# Patient Record
Sex: FEMALE | Race: WHITE | NOT HISPANIC OR LATINO | Employment: FULL TIME | URBAN - METROPOLITAN AREA
[De-identification: names, ages, dates, MRNs, and addresses within clinical notes are randomized per-mention and may not be internally consistent; named-entity substitution may affect disease eponyms.]

---

## 2017-05-28 ENCOUNTER — OFFICE VISIT - HEALTHEAST (OUTPATIENT)
Dept: FAMILY MEDICINE | Facility: CLINIC | Age: 37
End: 2017-05-28

## 2017-05-28 ENCOUNTER — COMMUNICATION - HEALTHEAST (OUTPATIENT)
Dept: SCHEDULING | Facility: CLINIC | Age: 37
End: 2017-05-28

## 2017-05-28 DIAGNOSIS — M70.61 TROCHANTERIC BURSITIS, RIGHT HIP: ICD-10-CM

## 2017-05-28 DIAGNOSIS — M53.3 SACROILIAC JOINT DYSFUNCTION OF RIGHT SIDE: ICD-10-CM

## 2017-05-28 DIAGNOSIS — M76.30 ILIOTIBIAL BAND SYNDROME: ICD-10-CM

## 2017-06-19 ENCOUNTER — OFFICE VISIT - HEALTHEAST (OUTPATIENT)
Dept: PHYSICAL THERAPY | Facility: REHABILITATION | Age: 37
End: 2017-06-19

## 2017-06-19 DIAGNOSIS — M62.81 GENERALIZED MUSCLE WEAKNESS: ICD-10-CM

## 2017-06-19 DIAGNOSIS — M25.551 RIGHT HIP PAIN: ICD-10-CM

## 2017-06-19 DIAGNOSIS — M53.3 SACROILIAC JOINT DYSFUNCTION OF RIGHT SIDE: ICD-10-CM

## 2017-06-21 ENCOUNTER — OFFICE VISIT - HEALTHEAST (OUTPATIENT)
Dept: PHYSICAL THERAPY | Facility: REHABILITATION | Age: 37
End: 2017-06-21

## 2017-06-21 DIAGNOSIS — M53.3 SACROILIAC JOINT DYSFUNCTION OF RIGHT SIDE: ICD-10-CM

## 2017-06-21 DIAGNOSIS — M25.551 RIGHT HIP PAIN: ICD-10-CM

## 2017-06-21 DIAGNOSIS — M62.81 GENERALIZED MUSCLE WEAKNESS: ICD-10-CM

## 2017-06-28 ENCOUNTER — COMMUNICATION - HEALTHEAST (OUTPATIENT)
Dept: PHYSICAL THERAPY | Facility: REHABILITATION | Age: 37
End: 2017-06-28

## 2017-07-05 ENCOUNTER — COMMUNICATION - HEALTHEAST (OUTPATIENT)
Dept: PHYSICAL THERAPY | Facility: REHABILITATION | Age: 37
End: 2017-07-05

## 2017-07-10 ENCOUNTER — OFFICE VISIT - HEALTHEAST (OUTPATIENT)
Dept: PHYSICAL THERAPY | Facility: REHABILITATION | Age: 37
End: 2017-07-10

## 2017-07-10 ENCOUNTER — COMMUNICATION - HEALTHEAST (OUTPATIENT)
Dept: PHYSICAL THERAPY | Facility: REHABILITATION | Age: 37
End: 2017-07-10

## 2017-07-10 DIAGNOSIS — M25.551 RIGHT HIP PAIN: ICD-10-CM

## 2017-07-10 DIAGNOSIS — M62.81 GENERALIZED MUSCLE WEAKNESS: ICD-10-CM

## 2017-07-10 DIAGNOSIS — M53.3 SACROILIAC JOINT DYSFUNCTION OF RIGHT SIDE: ICD-10-CM

## 2018-05-02 ENCOUNTER — RECORDS - HEALTHEAST (OUTPATIENT)
Dept: ADMINISTRATIVE | Facility: OTHER | Age: 38
End: 2018-05-02

## 2018-05-02 ENCOUNTER — HOSPITAL ENCOUNTER (OUTPATIENT)
Dept: CARDIOLOGY | Facility: HOSPITAL | Age: 38
Discharge: HOME | End: 2018-05-02

## 2018-05-02 DIAGNOSIS — R94.31 NONSPECIFIC ABNORMAL ELECTROCARDIOGRAM (ECG) (EKG): ICD-10-CM

## 2018-05-04 ENCOUNTER — AMBULATORY - HEALTHEAST (OUTPATIENT)
Dept: CARDIOLOGY | Facility: CLINIC | Age: 38
End: 2018-05-04

## 2018-05-04 ENCOUNTER — RECORDS - HEALTHEAST (OUTPATIENT)
Dept: ADMINISTRATIVE | Facility: OTHER | Age: 38
End: 2018-05-04

## 2018-05-07 ENCOUNTER — RECORDS - HEALTHEAST (OUTPATIENT)
Dept: LAB | Facility: CLINIC | Age: 38
End: 2018-05-07

## 2018-05-07 LAB
ALBUMIN SERPL-MCNC: 3.6 G/DL (ref 3.5–5)
ALP SERPL-CCNC: 84 U/L (ref 45–120)
ALT SERPL W P-5'-P-CCNC: 11 U/L (ref 0–45)
ANION GAP SERPL CALCULATED.3IONS-SCNC: 10 MMOL/L (ref 5–18)
AST SERPL W P-5'-P-CCNC: 17 U/L (ref 0–40)
BILIRUB SERPL-MCNC: 0.5 MG/DL (ref 0–1)
BUN SERPL-MCNC: 14 MG/DL (ref 8–22)
CALCIUM SERPL-MCNC: 9.4 MG/DL (ref 8.5–10.5)
CHLORIDE BLD-SCNC: 104 MMOL/L (ref 98–107)
CO2 SERPL-SCNC: 25 MMOL/L (ref 22–31)
CREAT SERPL-MCNC: 0.72 MG/DL (ref 0.6–1.1)
GFR SERPL CREATININE-BSD FRML MDRD: >60 ML/MIN/1.73M2
GLUCOSE BLD-MCNC: 102 MG/DL (ref 70–125)
POTASSIUM BLD-SCNC: 3.9 MMOL/L (ref 3.5–5)
PROT SERPL-MCNC: 6.5 G/DL (ref 6–8)
SODIUM SERPL-SCNC: 139 MMOL/L (ref 136–145)
TSH SERPL DL<=0.005 MIU/L-ACNC: 2.08 UIU/ML (ref 0.3–5)

## 2018-05-08 ENCOUNTER — OFFICE VISIT - HEALTHEAST (OUTPATIENT)
Dept: CARDIOLOGY | Facility: CLINIC | Age: 38
End: 2018-05-08

## 2018-05-08 DIAGNOSIS — R07.2 PRECORDIAL PAIN: ICD-10-CM

## 2018-05-08 ASSESSMENT — MIFFLIN-ST. JEOR: SCORE: 1764.51

## 2018-05-14 ENCOUNTER — COMMUNICATION - HEALTHEAST (OUTPATIENT)
Dept: CARDIOLOGY | Facility: CLINIC | Age: 38
End: 2018-05-14

## 2018-05-14 DIAGNOSIS — I10 HYPERTENSION: ICD-10-CM

## 2018-05-23 ENCOUNTER — HOSPITAL ENCOUNTER (OUTPATIENT)
Dept: CARDIOLOGY | Facility: HOSPITAL | Age: 38
Discharge: HOME | End: 2018-05-23
Attending: INTERNAL MEDICINE

## 2018-05-23 DIAGNOSIS — R07.2 PRECORDIAL PAIN: ICD-10-CM

## 2018-05-23 LAB
AORTIC ROOT: 3.18 CM
BSA FOR ECHO PROCEDURE: 2.23 M2
CV BLOOD PRESSURE: NORMAL MMHG
CV ECHO HEIGHT: 67 IN
CV ECHO WEIGHT: 233 LBS
CV STRESS CURRENT BP HE: NORMAL
CV STRESS CURRENT HR HE: 102
CV STRESS CURRENT HR HE: 102
CV STRESS CURRENT HR HE: 104
CV STRESS CURRENT HR HE: 106
CV STRESS CURRENT HR HE: 115
CV STRESS CURRENT HR HE: 117
CV STRESS CURRENT HR HE: 118
CV STRESS CURRENT HR HE: 122
CV STRESS CURRENT HR HE: 130
CV STRESS CURRENT HR HE: 130
CV STRESS CURRENT HR HE: 133
CV STRESS CURRENT HR HE: 140
CV STRESS CURRENT HR HE: 143
CV STRESS CURRENT HR HE: 143
CV STRESS CURRENT HR HE: 144
CV STRESS CURRENT HR HE: 150
CV STRESS CURRENT HR HE: 153
CV STRESS CURRENT HR HE: 153
CV STRESS CURRENT HR HE: 154
CV STRESS CURRENT HR HE: 156
CV STRESS CURRENT HR HE: 157
CV STRESS CURRENT HR HE: 157
CV STRESS CURRENT HR HE: 158
CV STRESS CURRENT HR HE: 90
CV STRESS CURRENT HR HE: 90
CV STRESS CURRENT HR HE: 91
CV STRESS CURRENT HR HE: 98
CV STRESS CURRENT HR HE: 99
CV STRESS DEVIATION TIME HE: NORMAL
CV STRESS ECHO PERCENT HR HE: NORMAL
CV STRESS EXERCISE STAGE HE: NORMAL
CV STRESS EXERCISE STAGE REACHED HE: NORMAL
CV STRESS FINAL RESTING BP HE: NORMAL
CV STRESS FINAL RESTING HR HE: 99
CV STRESS MAX HR HE: 158
CV STRESS MAX TREADMILL GRADE HE: 14
CV STRESS MAX TREADMILL SPEED HE: 3.4
CV STRESS PEAK DIA BP HE: NORMAL
CV STRESS PEAK SYS BP HE: NORMAL
CV STRESS PHASE HE: NORMAL
CV STRESS PROTOCOL HE: NORMAL
CV STRESS RESTING PT POSITION HE: NORMAL
CV STRESS ST DEVIATION AMOUNT HE: NORMAL
CV STRESS ST DEVIATION ELEVATION HE: NORMAL
CV STRESS ST EVELATION AMOUNT HE: NORMAL
CV STRESS TEST TYPE HE: NORMAL
CV STRESS TOTAL STAGE TIME MIN 1 HE: NORMAL
DOP CALC LVOT AREA: 3.7 CM2
DOP CALC LVOT DIAMETER: 2.17 CM
DOP CALC LVOT STROKE VOLUME: 59.5 CM3
DOP CALCLVOT PEAK VEL VTI: 16.1 CM
EJECTION FRACTION: 69 % (ref 55–75)
FRACTIONAL SHORTENING: 44.7 % (ref 28–44)
INTERVENTRICULAR SEPTUM IN END DIASTOLE: 0.82 CM (ref 0.6–0.9)
IVS/PW RATIO: 0.9
LEFT ATRIUM SIZE: 3.28 CM
LEFT VENTRICLE CARDIAC INDEX: 2.1 L/MIN/M2
LEFT VENTRICLE CARDIAC OUTPUT: 4.8 L/MIN
LEFT VENTRICLE DIASTOLIC VOLUME INDEX: 21.5 CM3/M2 (ref 34–74)
LEFT VENTRICLE DIASTOLIC VOLUME: 48 CM3 (ref 46–106)
LEFT VENTRICLE HEART RATE: 80 BPM
LEFT VENTRICLE MASS INDEX: 71.2 G/M2
LEFT VENTRICLE SYSTOLIC VOLUME INDEX: 6.8 CM3/M2 (ref 11–31)
LEFT VENTRICLE SYSTOLIC VOLUME: 15.1 CM3 (ref 14–42)
LEFT VENTRICULAR INTERNAL DIMENSION IN DIASTOLE: 5.08 CM (ref 3.8–5.2)
LEFT VENTRICULAR INTERNAL DIMENSION IN SYSTOLE: 2.81 CM (ref 2.2–3.5)
LEFT VENTRICULAR MASS: 158.7 G
LEFT VENTRICULAR OUTFLOW TRACT MEAN GRADIENT: 1.43 MMHG
LEFT VENTRICULAR OUTFLOW TRACT MEAN VELOCITY: 57.9 CM/S
LEFT VENTRICULAR OUTFLOW TRACT PEAK GRADIENT: 1.96 MMHG
LEFT VENTRICULAR POSTERIOR WALL IN END DIASTOLE: 0.95 CM (ref 0.6–0.9)
LV STROKE VOLUME INDEX: 26.7 ML/M2
MITRAL VALVE E/A RATIO: 1.1
MV AVERAGE E/E' RATIO: 6.6 CM/S
MV DECELERATION TIME: 0.17 S
MV E'TISSUE VEL-LAT: 12.6 CM/S
MV E'TISSUE VEL-MED: 10.6 CM/S
MV LATERAL E/E' RATIO: 6.1
MV MEDIAL E/E' RATIO: 7.2
MV PEAK A VELOCITY: 68.4 CM/S
MV PEAK E VELOCITY: 76.3 CM/S
NUC REST DIASTOLIC VOLUME INDEX: 3728 LBS
NUC REST SYSTOLIC VOLUME INDEX: 67 IN
STRESS ECHO BASELINE BP: NORMAL
STRESS ECHO BASELINE HR: 98 BPM
STRESS ECHO CALCULATED PERCENT HR: 86 %
STRESS ECHO LAST STRESS BP: NORMAL
STRESS ECHO LAST STRESS HR: 157
STRESS ECHO POST ESTIMATED WORKLOAD: 10.3
STRESS ECHO POST EXERCISE DUR MIN: 8
STRESS ECHO POST EXERCISE DUR SEC: 59
STRESS ECHO TARGET HR: 158.24
TRICUSPID VALVE ANULAR PLANE SYSTOLIC EXCURSION: 2.1 CM

## 2018-05-23 ASSESSMENT — MIFFLIN-ST. JEOR: SCORE: 1764.51

## 2018-12-03 ENCOUNTER — OFFICE VISIT - HEALTHEAST (OUTPATIENT)
Dept: FAMILY MEDICINE | Facility: CLINIC | Age: 38
End: 2018-12-03

## 2018-12-03 DIAGNOSIS — B34.9 VIRAL SYNDROME: ICD-10-CM

## 2018-12-14 ENCOUNTER — RECORDS - HEALTHEAST (OUTPATIENT)
Dept: LAB | Facility: CLINIC | Age: 38
End: 2018-12-14

## 2018-12-14 LAB
TSH SERPL DL<=0.005 MIU/L-ACNC: 0.47 UIU/ML (ref 0.3–5)
VIT B12 SERPL-MCNC: 590 PG/ML (ref 213–816)

## 2019-08-05 ENCOUNTER — COMMUNICATION - HEALTHEAST (OUTPATIENT)
Dept: TELEHEALTH | Facility: CLINIC | Age: 39
End: 2019-08-05

## 2019-08-05 ENCOUNTER — OFFICE VISIT - HEALTHEAST (OUTPATIENT)
Dept: FAMILY MEDICINE | Facility: CLINIC | Age: 39
End: 2019-08-05

## 2019-08-05 DIAGNOSIS — M54.50 ACUTE LEFT-SIDED LOW BACK PAIN WITHOUT SCIATICA: ICD-10-CM

## 2019-08-05 DIAGNOSIS — M53.3 SACROILIAC JOINT PAIN: ICD-10-CM

## 2019-08-07 ENCOUNTER — OFFICE VISIT - HEALTHEAST (OUTPATIENT)
Dept: PHYSICAL THERAPY | Facility: REHABILITATION | Age: 39
End: 2019-08-07

## 2019-08-07 DIAGNOSIS — R26.89 ANTALGIC GAIT: ICD-10-CM

## 2019-08-07 DIAGNOSIS — M53.3 PAIN OF LEFT SACROILIAC JOINT: ICD-10-CM

## 2019-08-07 DIAGNOSIS — M62.81 GENERALIZED MUSCLE WEAKNESS: ICD-10-CM

## 2019-08-14 ENCOUNTER — COMMUNICATION - HEALTHEAST (OUTPATIENT)
Dept: PHYSICAL THERAPY | Facility: REHABILITATION | Age: 39
End: 2019-08-14

## 2020-01-30 ENCOUNTER — RECORDS - HEALTHEAST (OUTPATIENT)
Dept: LAB | Facility: CLINIC | Age: 40
End: 2020-01-30

## 2020-01-30 LAB
ANION GAP SERPL CALCULATED.3IONS-SCNC: 10 MMOL/L (ref 5–18)
BUN SERPL-MCNC: 12 MG/DL (ref 8–22)
CALCIUM SERPL-MCNC: 9 MG/DL (ref 8.5–10.5)
CHLORIDE BLD-SCNC: 103 MMOL/L (ref 98–107)
CO2 SERPL-SCNC: 24 MMOL/L (ref 22–31)
CREAT SERPL-MCNC: 0.71 MG/DL (ref 0.6–1.1)
GFR SERPL CREATININE-BSD FRML MDRD: >60 ML/MIN/1.73M2
GLUCOSE BLD-MCNC: 93 MG/DL (ref 70–125)
POTASSIUM BLD-SCNC: 4 MMOL/L (ref 3.5–5)
SODIUM SERPL-SCNC: 137 MMOL/L (ref 136–145)
TSH SERPL DL<=0.005 MIU/L-ACNC: 1.57 UIU/ML (ref 0.3–5)

## 2020-02-17 ENCOUNTER — RECORDS - HEALTHEAST (OUTPATIENT)
Dept: LAB | Facility: CLINIC | Age: 40
End: 2020-02-17

## 2020-02-18 LAB
ANION GAP SERPL CALCULATED.3IONS-SCNC: 8 MMOL/L (ref 5–18)
BUN SERPL-MCNC: 13 MG/DL (ref 8–22)
CALCIUM SERPL-MCNC: 9 MG/DL (ref 8.5–10.5)
CHLORIDE BLD-SCNC: 105 MMOL/L (ref 98–107)
CO2 SERPL-SCNC: 28 MMOL/L (ref 22–31)
CREAT SERPL-MCNC: 0.67 MG/DL (ref 0.6–1.1)
GFR SERPL CREATININE-BSD FRML MDRD: >60 ML/MIN/1.73M2
GLUCOSE BLD-MCNC: 99 MG/DL (ref 70–125)
POTASSIUM BLD-SCNC: 4.1 MMOL/L (ref 3.5–5)
SODIUM SERPL-SCNC: 141 MMOL/L (ref 136–145)

## 2021-01-20 ENCOUNTER — RECORDS - HEALTHEAST (OUTPATIENT)
Dept: LAB | Facility: CLINIC | Age: 41
End: 2021-01-20

## 2021-01-20 LAB
ALBUMIN SERPL-MCNC: 4.3 G/DL (ref 3.5–5)
ALP SERPL-CCNC: 80 U/L (ref 45–120)
ALT SERPL W P-5'-P-CCNC: 14 U/L (ref 0–45)
ANION GAP SERPL CALCULATED.3IONS-SCNC: 9 MMOL/L (ref 5–18)
AST SERPL W P-5'-P-CCNC: 16 U/L (ref 0–40)
BILIRUB SERPL-MCNC: 0.7 MG/DL (ref 0–1)
BUN SERPL-MCNC: 15 MG/DL (ref 8–22)
CALCIUM SERPL-MCNC: 8.8 MG/DL (ref 8.5–10.5)
CHLORIDE BLD-SCNC: 105 MMOL/L (ref 98–107)
CHOLEST SERPL-MCNC: 158 MG/DL
CO2 SERPL-SCNC: 25 MMOL/L (ref 22–31)
CREAT SERPL-MCNC: 0.74 MG/DL (ref 0.6–1.1)
FASTING STATUS PATIENT QL REPORTED: YES
GFR SERPL CREATININE-BSD FRML MDRD: >60 ML/MIN/1.73M2
GLUCOSE BLD-MCNC: 91 MG/DL (ref 70–125)
HDLC SERPL-MCNC: 49 MG/DL
LDLC SERPL CALC-MCNC: 98 MG/DL
POTASSIUM BLD-SCNC: 4 MMOL/L (ref 3.5–5)
PROT SERPL-MCNC: 6.5 G/DL (ref 6–8)
SODIUM SERPL-SCNC: 139 MMOL/L (ref 136–145)
TRIGL SERPL-MCNC: 54 MG/DL

## 2021-03-25 ENCOUNTER — AMBULATORY - HEALTHEAST (OUTPATIENT)
Dept: ADMINISTRATIVE | Facility: CLINIC | Age: 41
End: 2021-03-25

## 2021-03-25 DIAGNOSIS — K52.9 COLITIS: ICD-10-CM

## 2021-03-26 ENCOUNTER — RECORDS - HEALTHEAST (OUTPATIENT)
Dept: RADIOLOGY | Facility: CLINIC | Age: 41
End: 2021-03-26

## 2021-03-26 RX ORDER — LOSARTAN POTASSIUM AND HYDROCHLOROTHIAZIDE 12.5; 5 MG/1; MG/1
TABLET ORAL
Status: SHIPPED | COMMUNITY
Start: 2020-01-30

## 2021-03-26 RX ORDER — METRONIDAZOLE 7.5 MG/G
GEL VAGINAL
Status: SHIPPED | COMMUNITY
Start: 2020-01-31 | End: 2022-02-22

## 2021-04-01 ENCOUNTER — OFFICE VISIT - HEALTHEAST (OUTPATIENT)
Dept: SURGERY | Facility: CLINIC | Age: 41
End: 2021-04-01

## 2021-04-01 DIAGNOSIS — K52.9 COLITIS: ICD-10-CM

## 2021-04-01 RX ORDER — SERTRALINE HYDROCHLORIDE 100 MG/1
100 TABLET, FILM COATED ORAL DAILY
Status: SHIPPED | COMMUNITY
Start: 2021-04-01 | End: 2022-02-22

## 2021-04-01 ASSESSMENT — MIFFLIN-ST. JEOR: SCORE: 1897.18

## 2021-05-17 ENCOUNTER — OFFICE VISIT - HEALTHEAST (OUTPATIENT)
Dept: UROLOGY | Facility: CLINIC | Age: 41
End: 2021-05-17

## 2021-05-17 DIAGNOSIS — N13.2 HYDRONEPHROSIS WITH URINARY OBSTRUCTION DUE TO URETERAL CALCULUS: ICD-10-CM

## 2021-05-17 DIAGNOSIS — N20.1 CALCULUS OF URETER: ICD-10-CM

## 2021-05-31 ENCOUNTER — RECORDS - HEALTHEAST (OUTPATIENT)
Dept: ADMINISTRATIVE | Facility: CLINIC | Age: 41
End: 2021-05-31

## 2021-05-31 VITALS — BODY MASS INDEX: 37.48 KG/M2 | WEIGHT: 239.3 LBS

## 2021-05-31 NOTE — PROGRESS NOTES
[facility]   Lumbo-Pelvic Initial Evaluation    Patient Name: Heather Ramos  Date of evaluation: 8/7/2019  Referral Diagnosis: Acute left-sided low back pain without sciatica  Sacroiliac joint pain [M53.3]   Referring provider: Wes Ruiz DO  Visit Diagnosis:     ICD-10-CM    1. Pain of left sacroiliac joint M53.3    2. Generalized muscle weakness M62.81    3. Antalgic gait R26.89        Assessment:      Impairments in  pain, posture, ROM, joint mobility, strength, ADL's, gait/locomotion and balance  Patient's signs and symptoms are consistent with acute L SI joint pain. Pt presents with L low back and buttocks pain that started while on vacation in June 2019. Her pain limits her ability to bend, sit, stand, walk, ascend/descend stairs, lift, dress and complete her household chores..  The POC is dynamic and will be modified on an ongoing basis.  Barriers to achieving goals as noted in the assessment section may affect outcome.  Prognosis to achieve goals is  good   Pt. is a good candidate for skilled PT services to improve pain levels and function.  Plan of care and goals were established in collaboration with patient.     Goals:  Pt. will demonstrate/verbalize independence in self-management of condition in : 4 weeks  Pt. will be independent with home exercise program in : 4 weeks    Pt will: be able to transition sit<>stand with <3/10 pain to return to PLOF in 8 weeks.  Pt will: be able to ascend/descend stairs with reciprocal pattern and without use of rail with <3/10 pain in 8 weeks.      Patient's expectations/goals are realistic.    Barriers to Learning or Achieving Goals:  Missed appointments.       Plan / Patient Instructions:        Plan of Care:   Communication with: Referral Source  Patient Related Instruction: Nature of Condition;Self Care instruction;Body mechanics;Next steps;Expected outcome;Posture;Basis of treatment;Treatment plan and rationale;Precautions  Times per Week:  1  Number of Weeks: 8  Number of Visits: 8  Discharge Planning: when goals are met or pt has reached a plateau in progress  Therapeutic Exercise: ROM;Stretching;Strengthening  Neuromuscular Reeducation: kinesio tape;posture;balance/proprioception;TNE;core  Manual Therapy: myofascial release;soft tissue mobilization;joint mobilization;muscle energy  Modalities: electrical stimulation;TENS;ultrasound;cold pack;hot pack;iontophoresis (prn)  Gait Training: to reduce pain and improve function  Equipment: theraband      Plan for next visit: progression of abdominal and hip strengthening (supine clamshells), assess response to SI joint belt, gentle MT as needed     Subjective:         Social information:   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 female who presents to therapy today with complaints of L sided low back pain that radiates to her posterior buttocks and proximal thigh. Pt returned from vacation where she was swimming and on a soft mattress with increased pain.  About 1 week prior to that she fell while roller-blading. Date of onset/duration of symptoms is mid-June while on vacation.  Pt has tried ice and heat without change in her symptoms. She also does not feel the muscle relaxer's have been helpful.     Pain Rating:3  Pain rating at best: 0  Pain rating at worst: 10  Pain description: sharp    Functional limitations are described as occurring with:   Sit-  Mild discomfort  Walk- pain increases by end of day  Transitions  Lifting leg  Dressing  Stairs- does not use left leg first  Bending  Lifting               Objective:      Note: Items left blank indicates the item was not performed or not indicated at the time of the evaluation.    Patient Outcome Measures :    Modified Oswestry Low Back Pain Disablity Questionnaire  in %: 40     Scores range from 0-100%, where a score of 0% represents minimal pain and maximal function. The minimal  clinically important difference is a score reduction of 12%.    Examination  1. Pain of left sacroiliac joint     2. Generalized muscle weakness     3. Antalgic gait       Involved side: Left  Posture Observation:   Supine: symmetrical leg length and ASIS    Increased time and slow, guarded transitions supine<>sit    Lumbar ROM:    Date: 8/7/2019     *Indicate scale AROM AROM AROM   Lumbar Flexion To proximal knees with pain     Lumbar Extension Severe with pain      Right Left Right Left Right Left   Lumbar Sidebending Mod with pain on L Severe with pain       Lumbar Rotation mild mild       Thoracic Flexion      Thoracic Extension      Thoracic Sidebending         Thoracic Rotation           Lower Extremity Strength:     Date: 8/7/2019     LE strength/5 Right Left Right Left Right Left   Hip Flexion (L1-3) 4 2+       Hip Extension (L5-S1)         Hip Abduction (L4-5)         Hip Adduction (L2-3)         Hip External Rotation 3+ 3+       Hip Internal Rotation         Knee Extension (L3-4) 5 5       Knee Flexion         Ankle Dorsiflexion (L4-5) 5 5       Great Toe Extension (L5)         Ankle Plantar flexion (S1)         Abdominals        Sensation    WNL       Reflex Testing  Lumbar Dermatomes Right Left UE Reflexes Right Left   Iliac Crest and Groin (L1)   Biceps (C5-6)     Anterior Medial Thigh (L2)   Brachioradialis (C5-6)     Anterior Thigh, Medial Epicondyle Femur (L3)   Triceps (C7-8)     Lateral Thigh, Anterior Knee, Medial Leg/Malleolus (L4)   Florin s test     Lateral Leg, Dorsal Foot (L5)   LE Reflexes     Lateral Foot (S1)   Patellar (L3-4)     Posterior Leg (S2)   Achilles (S1-2)     Other:   Babinski Response         Lumbar Special Tests:       Lumbar Special Tests Right Left SI Tests Right  Left   Quadrant test   SI Compression - +   Straight leg raise - - SI Distraction - +   Crossover response   POSH Test     Slump   Sacral Thrust     Sit-up test  FADIR - -   Trunk extensor endurance test   Resisted Abduction     Prone instability test  Other:USMAN - -   Pubic shotgun  Other:Scour - -       LE Screen:  Hip PROM painful on R and limited due to pain in flexion, IR and ER    Treatment Today   Pt arrived 10 min late.  TREATMENT MINUTES COMMENTS   Evaluation 22 -lumbar spine   Self-care/ Home management 8 -pt completed a trial of the SI-LOC belt, reported relief with sit<>stand, walking and sitting. Pt educated on where to purchase for home use.   Manual therapy     Neuromuscular Re-education     Therapeutic Activity     Therapeutic Exercises 10 -see exercise flow sheet  -educated on POC, diagnosis and HEP   Gait training     Modality__________________                Total 40    Blank areas are intentional and mean the treatment did not include these items.     PT Evaluation Code: (Please list factors)  Patient History/Comorbidities: see above  Examination: lumbar spine  Clinical Presentation: stable  Clinical Decision Making: low    Patient History/  Comorbidities Examination  (body structures and functions, activity limitations, and/or participation restrictions) Clinical Presentation Clinical Decision Making (Complexity)   No documented Comorbidities or personal factors 1-2 Elements Stable and/or uncomplicated Low   1-2 documented comorbidities or personal factor 3 Elements Evolving clinical presentation with changing characteristics Moderate   3-4 documented comorbidities or personal factors 4 or more Unstable and unpredictable High                Yesica Chaves, PT, DPT  8/7/2019  8:00 AM      [facility] Discharge Summary    Patient was seen for 1 visit on 8/7/19 with 3 missed appointments.  The patient has not shown for their scheduled appointment(s) and has not called ot reschedule.  Goals were not met.  Three attempts to contact the patient were made, however the patient has not responded to our attempts.  Patient received a home program .  The patient discontinued therapy, did not  return. Goals not met as pt did not return to PT.    Therapy will be discontinued at this time.  The patient will need a new referral to resume.    Thank you for your referral.  Yesica Chaves  10/23/2019  2:02 PM

## 2021-06-01 VITALS — WEIGHT: 233 LBS | HEIGHT: 67 IN | BODY MASS INDEX: 36.57 KG/M2

## 2021-06-01 VITALS — WEIGHT: 233 LBS | BODY MASS INDEX: 36.57 KG/M2 | HEIGHT: 67 IN

## 2021-06-02 VITALS — WEIGHT: 240.3 LBS | BODY MASS INDEX: 37.64 KG/M2

## 2021-06-03 ENCOUNTER — RECORDS - HEALTHEAST (OUTPATIENT)
Dept: ADMINISTRATIVE | Facility: CLINIC | Age: 41
End: 2021-06-03

## 2021-06-03 VITALS — WEIGHT: 250.19 LBS | BODY MASS INDEX: 39.18 KG/M2

## 2021-06-05 VITALS
DIASTOLIC BLOOD PRESSURE: 102 MMHG | WEIGHT: 257 LBS | SYSTOLIC BLOOD PRESSURE: 156 MMHG | HEIGHT: 69 IN | BODY MASS INDEX: 38.06 KG/M2

## 2021-06-11 NOTE — PROGRESS NOTES
[facility] Discharge Summary  Patient Name: Heather Ramos  Date: 7/10/2017  Referral Diagnosis: Right Hip/Back Pain  Referring provider: Lio Gregorio MD  Visit Diagnosis: No diagnosis found.    Goals:  Pt. will demonstrate/verbalize independence in self-management of condition in : 6 weeks  Pt will: Able to get in and out of a chair without difficulty within 4-6 weeks  Pt will: Able to ambulate up stairs with pain 0-2/10 within 4-6 weeks  Pt will: Able to lift son and not have fear of taking first few steps with pain levels 0-2/10 within 4-6 weeks    Patient was seen for 2 visits from 6/19/17 to 6/21/17 with 3 missed appointments.  The patient has not shown for their scheduled appointment(s) and has not called ot reschedule.  Goals were not met.    Therapy will be discontinued at this time.  The patient will need a new referral to resume.    Thank you for your referral.  Amaya Jimenez  7/10/2017  8:23 AM

## 2021-06-11 NOTE — PROGRESS NOTES
[facility]   Lumbo-Pelvic Initial Evaluation    Patient Name: Heather Ramos  Date of evaluation: 6/19/2017  Referral Diagnosis: Trochanteric bursitis, right hip, SI pain  Referring provider: Wes Ruiz DO  Visit Diagnosis:     ICD-10-CM    1. Sacroiliac joint dysfunction of right side M53.3    2. Right hip pain M25.551    3. Generalized muscle weakness M62.81        Assessment:      Pt. is appropriate for skilled PT intervention as outlined in the Plan of Care (POC).  Pt. is a good candidate for skilled PT services to improve pain levels and function.  Pt felt relief with the MET's.      Goals:  Pt. will demonstrate/verbalize independence in self-management of condition in : 6 weeks  Pt will: Able to get in and out of a chair without difficulty within 4-6 weeks  Pt will: Able to ambulate up stairs with pain 0-2/10 within 4-6 weeks  Pt will: Able to lift son and not have fear of taking first few steps with pain levels 0-2/10 within 4-6 weeks    Patient's expectations/goals are realistic.    Barriers to Learning or Achieving Goals:  none       Plan / Patient Instructions:        Plan of Care:   Authorization / Certification Start Date: 06/19/17  Authorization / Certification End Date: 09/12/17  Authorization / Certification Number of Visits: 8-12  Communication with: Referral Source  Patient Related Instruction: Nature of Condition;Treatment plan and rationale;Self Care instruction;Basis of treatment;Body mechanics;Posture  Times per Week: 2  Number of Weeks: 4-6  Number of Visits: 8-12  Therapeutic Exercise: ROM;Stretching;Strengthening  Neuromuscular Reeducation: posture;kinesio tape;core  Manual Therapy: myofascial release  Modalities: electrical stimulation;ultrasound    Plan for next visit: review stretches                                MET for SI prn                               MFR right piriformis, gluteius medius                                 Strengthening prn     Subjective:          Social information:   Living Situation:apartment, lives with others  and stairs  with railing   Occupation:alarm manager     History of Present Illness:    Heather is a 37 y.o. female who presents to therapy today with complaints of right SI, hip pain. Date of onset/duration of symptoms is 2016. Onset was during pregnancy.  Patient experienced pain the last few months of pregnancy.  Once she delivered the pain never left.. Symptoms are intermittent and not improving.  She will get sharp symptoms and other times it is not present.   She denies history of similar symptoms. During a flare-up she has numbing in her low back and shooting pain.  She did see a chiropractor for 4 visits but did not feel it was helpful.  Denies having any imaging.  The pain in her hip does not bother her anymore (but was painful to palpation during evaluation).    Pain Ratin  Pain rating at best: 0  Pain rating at worst: 9  Pain description: burning, dull, numbness, sharp, shooting, soreness, tingling and weakness    Functional limitations are described as occurring with:   ambulating up stairs, picking up son and taking first few steps, getting out of a chair    Patient reports benefit from:  rest, change position, heat, massager         Objective:      Note: Items left blank indicates the item was not performed or not indicated at the time of the evaluation.    Patient Outcome Measures :    Pt did not fill out enough questions for a valid questionnaire      Examination  1. Sacroiliac joint dysfunction of right side     2. Right hip pain     3. Generalized muscle weakness       Involved side: Right  Posture Observation:      General standing posture is fair.    Lumbar ROM:         Within Normal Limits unless noted  Date: 17     *Indicate scale AROM AROM AROM   Lumbar Flexion      Lumbar Extension       Right Left Right Left Right Left   Lumbar Sidebending         Lumbar Rotation                                         Hip  ROM    Within normal limits unless noted  Date: 6/19/17     Hip ROM( ) AROM in degrees AROM in degrees AROM in degrees    Right Left Right Left Right Left   Hip Flexion (0-120 )         Hip Abduction (0-45 )         Hip External Rotation (0-50 )         Hip Internal Rotation (0-40 )         Hip Extension (0-15 )          PROM in degrees PROM in degrees PROM in degrees    Right Left Right Left Right Left   Hip Flexion (0-120 )         Hip Abduction (0-45 )         Hip External Rotation (0-50 )         Hip Internal Rotation (0-40 )         Hip Extension (0-15 )           Lower Extremity Strength:     Date: 6/19/17     LE strength/5 Right Left Right Left Right Left   Hip Flexion (L1-3) 5 5       Hip Extension (L5-S1) 4 4+       Hip Abduction (L4-5) 4+ 4+       Hip Adduction (L2-3) 4+ 4+       Hip External Rotation         Hip Internal Rotation         Knee Extension (L3-4) 5 5       Knee Flexion 5 5       Ankle Dorsiflexion (L4-5)         Great Toe Extension (L5)         Ankle Plantar flexion (S1)         Abdominals        Sensation           Reflex Testing  Lumbar Dermatomes Right Left UE Reflexes Right Left   Iliac Crest and Groin (L1)   Biceps (C5-6)     Anterior Medial Thigh (L2)   Brachioradialis (C5-6)     Anterior Thigh, Medial Epicondyle Femur (L3)   Triceps (C7-8)     Lateral Thigh, Anterior Knee, Medial Leg/Malleolus (L4)   Florin s test     Lateral Leg, Dorsal Foot (L5)   LE Reflexes     Lateral Foot (S1)   Patellar (L3-4)     Posterior Leg (S2)   Achilles (S1-2)     Other:   Babinski Response       Palpation:  Tender over right low lumbar paraspinals, QL, SI joint, piriformis, gluteius medius, ITB, greater trochanter  1 Leg Stance: 20+ seconds bilaterally  Trendelenberg: negative bilaterally  Squat: hips deviate to the right  Able to toe and heel walk  Bridging: weaknesses noted due to quality of task    Lumbar Special Tests:     Lumbar Special Tests Right Left SI Tests Right  Left   Quadrant test   SI  Compression     Straight leg raise - - SI Distraction     Crossover response - - POSH Test     Slump - - Sacral Thrust     Sit-up test  FADIR     Trunk extensor endurance test  Resisted Abduction     Prone instability test  Other:     Pubic shotgun  Other:       Hip Special Tests  OA Right (+/-) Left (+/-) Intra Articular Right (+/-) Left (+/-)   Hip Scour - - USMAN - -   Test Cluster  -Hip pain  -Hip IR <15   -Hip Flex <115    FADIR     Test Cluster  -Painful Hip IR  ->50 years old  -Morning Stiffness <60 min   Passive Supine Rotation Test     Misc. Right (+/-) Left (+/-) Stinchfield Test (SLR Against Resistance)     Ely s   DEXRIT     Brennon s   DIRI     Trendelenburg - - Posterior Rim Impingement     SIJ Right (+/-) Left (+/-) Lateral Rim Impingement     SIJ Compression + - Other     SIJ Distraction - - Other     POSH Test   Other     Sacral Thrust   Other         Exercises:  Exercise #1: supine piriformis, sitting hamstring, LTR, midback and midback rotation   Comment #1: 30 seconds X 1-3 reps   ITB stretch/hip add with flexion in supine    Right ASIS higher, R LL shorter than L, PSIS on R deeper                All equal after MET    Treatment Today     TREATMENT MINUTES COMMENTS   Evaluation 30 Lumbar/right hip/ITB   Self-care/ Home management     Manual therapy 10 NIA right:  Shotgun MET X 3, R NIA MET X 3  Pt felt looser after RX, ASIS = and LL= after RX   Neuromuscular Re-education     Therapeutic Activity     Therapeutic Exercises 13 Edu on DX, SI joint mechanics, and POC   Gait training     Modality__________________                Total 53    Blank areas are intentional and mean the treatment did not include these items.       PT Evaluation Code: (Please list factors)  Patient History/Comorbidities: none  Examination: pain, weakness, decrease in functional activities  Clinical Presentation: stable  Clinical Decision Making: low    Patient History/  Comorbidities Examination  (body structures and functions,  activity limitations, and/or participation restrictions) Clinical Presentation Clinical Decision Making (Complexity)   No documented Comorbidities or personal factors 1-2 Elements Stable and/or uncomplicated Low   1-2 documented comorbidities or personal factor 3 Elements Evolving clinical presentation with changing characteristics Moderate   3-4 documented comorbidities or personal factors 4 or more Unstable and unpredictable High              Amaya Jimenez, PT  6/19/2017  8:58 AM

## 2021-06-11 NOTE — PROGRESS NOTES
[facility] Daily Progress     Patient Name: Heather Ramos  Date: 2017  Visit #: 2  Referral Diagnosis: Trochanteric bursitis, right hip; SI Pain  Referring provider: Wes Ruiz DO  Visit Diagnosis:     ICD-10-CM    1. Sacroiliac joint dysfunction of right side M53.3    2. Right hip pain M25.551    3. Generalized muscle weakness M62.81          Assessment:     Patient is benefitting from skilled physical therapy and is making steady progress toward functional goals.  Patient is appropriate to continue with skilled physical therapy intervention, as indicated by initial plan of care.    Goal Status:  Pt. will demonstrate/verbalize independence in self-management of condition in : 6 weeks  Pt will: Able to get in and out of a chair without difficulty within 4-6 weeks  Pt will: Able to ambulate up stairs with pain 0-2/10 within 4-6 weeks  Pt will: Able to lift son and not have fear of taking first few steps with pain levels 0-2/10 within 4-6 weeks    Plan / Patient Education:     MFR   Edu on body mechanics    Subjective:     Pain Ratin    I can feel I am doing something.  Not quite pain but feel something, but not in a good way.  Makes me want to be careful         Objective:     Posture edu for sitting, standing and sleeping.    Exercises:  Exercise #1: supine piriformis, sitting hamstring, LTR, midback and midback rotation   Comment #1: 30 seconds X 1-3 reps    Treatment Today     TREATMENT MINUTES COMMENTS   Evaluation     Self-care/ Home management     Manual therapy 15 Pt left sidelying with pillow between knees;  MFR right gluetius medius, piriformis   Neuromuscular Re-education     Therapeutic Activity     Therapeutic Exercises 15 See flow sheet or above   Gait training     Modality__________________                Total 30    Blank areas are intentional and mean the treatment did not include these items.       Amaya Jimenez, PT  2017

## 2021-06-16 PROBLEM — R07.2 PRECORDIAL PAIN: Status: ACTIVE | Noted: 2018-05-08

## 2021-06-16 NOTE — PROGRESS NOTES
History:   Heather Ramos is a 41 y.o. female referred to general surgery (by Dr. Gregorio) for a thickened appendix.  The patient was recently evaluated in the [facility] for left-sided abdominal pain and hematochezia (3/12/21).  Her symptoms were consistent with the colitis that was seen on her CT (see report below).  The CT also incidentally visualized her chronically mildly dilated appendix.  This scan was compared to one from  and found to be similar.  She states that her sharp left-sided abdominal pain have resolved.  She showed me a picture of the blood on the stool in her toilet.  She had 2 episodes of the bloody stools.  As that was happening, she felt dizzy and ended up sleeping on the bathroom floor.  Overall, she feels like the left side of her abdomen is full.  She denies having any specific right lower quadrant symptoms.  Once a month around her period, she will have lower groin pains.    She was going to follow-up with gastroenterology.  She just missed her appointment.  She will be following up with them regarding her colitis.  Her last colonoscopy was in .    Allergies:  Amoxicillin and Penicillins    Past medical history:  HTN  IBS  Depression    Past surgical history:  Tonsillectomy  [place] tooth extraction  Right wrist cyst removal      Medications:     levalbuterol (XOPENEX HFA) 45 mcg/actuation inhaler, Inhale 1-2 puffs., Disp: , Rfl:      losartan-hydrochlorothiazide (HYZAAR) 50-12.5 mg per tablet, TK 1 T PO QD, Disp: , Rfl:      sertraline (ZOLOFT) 100 MG tablet, Take 100 mg by mouth daily., Disp: , Rfl:      benzonatate (TESSALON) 200 MG capsule, Take 200 mg by mouth., Disp: , Rfl:      labetalol (TRANDATE; NORMODYNE) 100 MG tablet, Take 1 tablet (100 mg total) by mouth 2 (two) times a day., Disp: 60 tablet, Rfl: 11     metroNIDAZOLE (METROGEL) 0.75 % vaginal gel, INSERT ONE APPLICATORFUL VAGINALLY ONCE DAILY AT BEDTIME FOR 5 DAYS., Disp: , Rfl:      senna-docusate  "(PERICOLACE) 8.6-50 mg tablet, Take 1 tablet by mouth 2 (two) times a day as needed for constipation., Disp: 40 tablet, Rfl: 0    Family history:  Mother  of lung cancer in her 60s.  Brother  around 32 of a metastatic mucinous carcinoma within his abdomen.  Paternal grandfather had colon cancer.    Social history:  Reports that she has never smoked. She has never used smokeless tobacco. She reports that she does not drink alcohol or use drugs.    Review of Systems:  General: No complaints or constitutional symptoms  Hematologic/Lymphatic: No symptoms or complaints  Psychiatric: No symptoms or complaints  Endocrine: No excessive fatigue, no hypermetabolic symptoms reported  Respiratory: No cough, shortness of breath, or wheezing  Cardiovascular: No chest pain or dyspnea on exertion  Gastrointestinal: Intermittent diarrhea and constipation.  She tends to have more bowel movements around her period.  Musculoskeletal: No recent injuries reported  Neurological: No focal neurologic defects reported  Breast: No discharge, skin changes, or palpable masses    Exam:  BP (!) 156/102 (Patient Site: Right Arm, Patient Position: Sitting, Cuff Size: Adult Regular)   Ht 5' 8.5\" (1.74 m)   Wt (!) 257 lb (116.6 kg)   BMI 38.51 kg/m    Body mass index is 38.51 kg/m .  General : Alert, cooperative, appears stated age   Skin: Skin color, texture, turgor normal, no rashes or lesions   Lymphatic: No obvious adenopathy, no swelling   Eyes: No scleral icterus, pupils equal  HENT: No traumatic injury to the head or face, no gross abnormalities  Lungs: Normal respiratory effort, breath sounds equal bilaterally  Heart: Regular rate and rhythm  Abdomen: Obese, soft, nondistended, nontender to palpation  Musculoskeletal: No obvious swelling  Neurologic: Grossly intact    Labs:  Hgb and WBC on 3/12 WNL    Imaging:   Pertinent images personally reviewed by myself and discussed with the patient.  Radiology reports:  EXAM: CT ABDOMEN " PELVIS W  LOCATION: [facility] [place]  DATE/TIME: 3/12/2021 5:07 PM    INDICATION: LLQ abdominal pain, diverticulitis suspected, LLQ pain, bloody stool.  COMPARISON: 01/20/2014.  TECHNIQUE: CT scan of the abdomen and pelvis was performed following injection of IV contrast. Multiplanar reformats were obtained. Dose reduction techniques were used.  CONTRAST: IOPAMIDOL 300 MG/ML  ML BOTTLE: 100mL    FINDINGS:   LOWER CHEST: Normal.    HEPATOBILIARY: Multiple fluid attenuation hepatic hypodensities consistent with cysts, no further characterization required.    PANCREAS: Normal.    SPLEEN: Normal.    ADRENAL GLANDS: Normal.    KIDNEYS/BLADDER: Normal.    BOWEL: The appendix measures up to 8 mm without adjacent inflammatory change, the appendix was noted to be mildly thickened on CT dated 01/28/2014, may reflect chronic or granulomatous appendicitis. Short-segment mural thickening of the proximal sigmoid colon concerning for colitis. Mild volume retained feces. No diverticulitis or obstruction.    LYMPH NODES: Normal.    VASCULATURE: Unremarkable.    PELVIC ORGANS: 2.9 cm right ovarian cyst.    MUSCULOSKELETAL: Normal.    IMPRESSION:   1.  Short-segment mural thickening of the proximal sigmoid colon, favored to represent colitis, recommend follow-up to resolution.  2.  Chronic thickened appendix, may reflect chronic or granulomatous appendicitis. Correlate to exclude acute appendicitis. No organized fluid collection. The appendix was noted to be mildly thickened on prior CT dated 01/28/2014.  3.  2.9 cm right ovarian cyst. No further follow-up required.    Assessment/Plan:   Heather Ramos is a 41 y.o. female with signs and symptoms consistent with resolved colitis.  I have explained the possible etiologies for her colitis.  I then pulled up her CT and showed her the images.  I do not have imaging from 2014.  I can clearly visualize her appendix.  There are no surrounding inflammatory changes.  According  to radiology, the appendiceal thickening is chronic.  Since these are chronic findings, I do not think that any of her symptoms are related to her appendix.  If there was an underlying mass, I would expect for there to have been more significant changes since 2014.  This is likely an incidental finding.  We discussed signs and symptoms of acute appendicitis and when to seek urgent evaluation.  Otherwise, I do not recommend surgical intervention at this time.  She will be following up with gastroenterology next.  She can follow-up with general surgery as needed.    Erica Pastor DO  General Surgeon  [facility]  Surgery Clinic - [address] [address]  Office: [phone]  Employed by - [facility]

## 2021-06-17 NOTE — PATIENT INSTRUCTIONS - HE
Patient Instructions by eWs Ruiz DO at 8/5/2019 12:40 PM     Author: Wes Ruiz DO Service: -- Author Type: Physician    Filed: 8/5/2019  1:03 PM Encounter Date: 8/5/2019 Status: Addendum    : Wes Ruiz DO (Physician)    Related Notes: Original Note by Wes Ruiz DO (Physician) filed at 8/5/2019  1:02 PM       Caution that the cyclobenzaprine may cause drowsiness. Try OTC ibuprofen or naproxen for pain relief also, and a cold pack to the area of discomfort.    Patient Education     Self-Care for Low Back Pain    Most people have low back pain now and then. In many cases, it isnt serious and self-care can help. Sometimes low back pain can be a sign of a bigger problem. Call your healthcare provider if your pain returns often or gets worse over time. For the long-term care of your back, get regular exercise, lose any excess weight and learn good posture.  Take a short rest  Lying down during the day may be beneficial for short periods of time if severe pain increases with sitting or standing. Long-term bed rest could be detrimental.  Reduce pain and swelling  Cold reduces swelling. Both cold and heat can reduce pain. Protect your skin by placing a towel between your body and the ice or heat source.    For the first few days, apply an ice pack for 15 to 20 minutes .    After the first few days, try heat for 15 minutes at a time to ease pain. Never sleep on a heating pad.    Over-the-counter medicine can help control pain and swelling. Try aspirin or ibuprofen.  Exercise  Exercise can help your back heal. It also helps your back get stronger and more flexible, preventing any reinjury. Ask your healthcare provider about specific exercises for your back.  Use good posture to avoid reinjury    When moving, bend at the hips and knees. Dont bend at the waist or twist around.    When lifting, keep the object close to your body. Dont try to lift more than you can handle.    When sitting, keep your  lower back supported. Use a rolled-up towel as needed.  Seek immediate medical care if:    Youre unable to stand or walk.    You have a temperature over 100.4 F (38.0 C)    You have frequent, painful, or bloody urination.    You have severe abdominal pain.    You have a sharp, stabbing pain.    Your pain is constant.    You have pain or numbness in your leg.    You feel pain in a new area of your back.    You notice that the pain isnt decreasing after more than a week.   Date Last Reviewed: 9/29/2015 2000-2017 [email or web address] [address] [place] [address], [place] [address]. All rights reserved. This information is not intended as a substitute for professional medical care. Always follow your healthcare professional's instructions.

## 2021-06-17 NOTE — PATIENT INSTRUCTIONS - HE
Calcium Oxalate Stone Prevention Self Management    Drink more fluids:    Drinking more liquids is the best way you can help prevent future stones. Stones can form when substances in the urine are too concentrated. The more you drink, the more urine you will make. This means all substances in the urine will be less concentrated.    How much urine should I be producing?    The usual recommended daily urine production is about 2 to 3 quarts (9380-9069 ml). If you are producing more than 3 quarts of urine on a regular basis, it is possible to deplete important minerals stored in the body.    To measure the amount of urine you produce in a day, you can either:    Collect all urine in a container and measure at the end of the day     Use a measuring cup each time you urinate and add up the amounts at the end of the day     Observe    Color - Dark sangeetha urine is concentrated. Light straw color or lighter is dilute and desirable     Odor - Concentrated urine tends to smell stronger. Dilute urine is nearly odorless    Ways to increase your fluid intake    Increasing the amount of fluid you drink is effective for all types of kidney stones. While water is commonly recommended, all fluids are effective for increasing the amount of urine your body produces.    Focus on starting a lifelong habit, rather than a short-term solution.     Keep liquids on hand that you like. Crystal Light is a low calorie appropriate choice.    Drink out of larger glasses. You'll tend to drink more with each serving.     Have an additional glass of fluid with each meal.     Keep a water or drink bottle at work and fill it regularly.     *If you are prone to fluid retention, consult your doctor before making changes to your fluid habits.    Low Oxalate Diet:    Avoid excess amounts or daily consumption of these foods:    All nuts and nut products including peanuts, almonds, pecans, peanut butter, almond milk    Rhubarb    Chocolate    Soybeans and  soy products     Spinach    Wheat Germ    Beets    Maintain a normal calcium diet:    Researches have found that people with low calcium intakes tend to have more stones. Foods with high calcium content are acceptable and include:    Dairy products (including milk, cheese and yogurt)    Meat and fish    Enriched cereals    Dark green vegetables    What about calcium supplements?     Many people take calcium supplements, either on their own or as prescribed by a doctor. Research has indicated that calcium supplements do not usually pose a risk for stone formation.  Calcium citrate is a better choice for a supplement.    Avoid excess salt:    Salt (sodium chloride) is found in abundance in many foods. High sodium levels in the urine can interfere with the kidney's handling of calcium.     Tips for reducing the salt in your diet:    Don't use salt at the table    Reduce the salt used in food preparation. Try 1/2 teaspoon when recipes call for 1 teaspoon.    Use herbs and spices for flavoring instead of salt.    Avoid salty foods.    Check the label before you buy or use a product. Note sodium and portion size information.    Try to consume less than 2,000 mg/day. (1 teaspoon = 2,000 mg)    Foods with high sodium content include:    Processed meat (including luncheon meats, sausage)     Crackers     Instant cereal     Processed cheese     Canned soups     Chips and snack foods     Soy sauce    [facility] can respond to your questions or concerns 24 hours a day at [phone].

## 2021-06-17 NOTE — PROGRESS NOTES
Assessment/Plan:    Assessment & Plan   Heather was seen today for new problem - ed referred.    Diagnoses and all orders for this visit:    Calculus of ureter    Hydronephrosis with urinary obstruction due to ureteral calculus    Stone Management Plan  [facility] 5/17/2021   Urinary Tract Infection No suspicion of infection   Renal Colic Asymptomatic at this time   Renal Failure No suspicion of renal failure   Current CT date 5/16/2021   Right sided stones? Yes   R Number of ureteral stones 1   R GSD of ureteral stones 1   R Location of ureteral stone Distal   R Number of kidney stones  No renal stones   R GSD of kidney stones N/A   R Hydronephrosis Mild   R Stone Event New stone passed prior to visit   Left sided stones? No   L Stone Event No current event         PLAN    40 yo F first time stone former with recently diagnosed tiny, obstructing right UVJ stone.    Excellent prognosis for stone passage if not already transpired. Will proceed with medical expulsive therapy. Risks and benefits were detailed of medical expulsive therapy including probability of stone passage, recurrent renal colic, and requirement of emergency medical and/or surgical care and further imaging. Stone prevention measures reviewed with patient. Patient verbalized understanding. Patient agrees with plan as discussed.  She will return on a prn basis.    For symptom control, she was prescribed oxycodone. Over the counter symptom control medications of ibuprofen, Dramamine and Tylenol were recommended.    Video visit duration: 19 minutes  25 minutes spent on the date of the encounter doing chart review, history and exam, documentation and further activities per the note    Marguerite Lao PA-C  [facility]    Subjective:      HPI  Ms. Heather Ramos is a 41 y.o.  female who is being evaluated via a billable video visit by [facility] following [facility] ER visit for  urolithiasis.    She is a first time unidentified composition stone former. She has no identified modifiable stone risk factors. She has no identified non-modifiable stone risk factors.    She was seen in ER yesterday for acute onset, sharp, constant right flank pain last night. She described similar pain in the past which was less severe and resolved promptly. She noted pain radiating in the right lower abdomen. She had associated nausea. Workup was notable for CT reporting an obstructing right UVJ stone. She was sent home with oxycodone.    She is asymptomatic at present. She denies symptoms of fever, chills, flank pain, nausea, vomiting, urinary frequency and dysuria.     CT scan from 5/16/21 is personally reviewed and demonstrates a mildly obstructing 1 mm right UVJ stone.    Significant labs from presentation include no hematuria, no pyuria, negative nitrite, no bacteria, mildly elevated WBC, normal C reactive protein, normal creatinine and normal potassium.     ROS   A 12 point comprehensive review of systems is negative except for HPI    Past Medical History:   Diagnosis Date     Abnormal Pap smear of cervix 2009     Hypertension        Past Surgical History:   Procedure Laterality Date     CYST REMOVAL Right     Wrist     TONSILLECTOMY       WISDOM TOOTH EXTRACTION         Current Outpatient Medications   Medication Sig Dispense Refill     acetaminophen (TYLENOL) 500 MG tablet Take 2 tablets (1,000 mg total) by mouth 4 (four) times a day for 7 days. 56 tablet 0     dimenhyDRINATE (DRAMAMINE) 50 MG tablet Take 1 tablet (50 mg total) by mouth 4 (four) times a day as needed. 28 tablet 0     ibuprofen (ADVIL,MOTRIN) 200 MG tablet Take 2 tablets (400 mg total) by mouth 4 (four) times a day for 7 days. 56 tablet 0     losartan-hydrochlorothiazide (HYZAAR) 50-12.5 mg per tablet TK 1 T PO QD       oxyCODONE (ROXICODONE) 5 MG immediate release tablet take 1 tablet every 4-6 hours as needed if pain is not improved  with acetaminophen and ibuprofen. 10 tablet 0     sertraline (ZOLOFT) 100 MG tablet Take 100 mg by mouth daily.       metroNIDAZOLE (METROGEL) 0.75 % vaginal gel INSERT ONE APPLICATORFUL VAGINALLY ONCE DAILY AT BEDTIME FOR 5 DAYS.       No current facility-administered medications for this visit.        Allergies   Allergen Reactions     Amoxicillin      Penicillins        Social History     Socioeconomic History     Marital status: Single     Spouse name: Not on file     Number of children: Not on file     Years of education: Not on file     Highest education level: Not on file   Occupational History     Not on file   Social Needs     Financial resource strain: Not on file     Food insecurity     Worry: Not on file     Inability: Not on file     Transportation needs     Medical: Not on file     Non-medical: Not on file   Tobacco Use     Smoking status: Never Smoker     Smokeless tobacco: Never Used   Substance and Sexual Activity     Alcohol use: No     Drug use: No     Sexual activity: Not on file   Lifestyle     Physical activity     Days per week: Not on file     Minutes per session: Not on file     Stress: Not on file   Relationships     Social connections     Talks on phone: Not on file     Gets together: Not on file     Attends Hindu service: Not on file     Active member of club or organization: Not on file     Attends meetings of clubs or organizations: Not on file     Relationship status: Not on file     Intimate partner violence     Fear of current or ex partner: Not on file     Emotionally abused: Not on file     Physically abused: Not on file     Forced sexual activity: Not on file   Other Topics Concern     Not on file   Social History Narrative     Not on file       No family history on file.    Objective:      Vitals - Patient Reported  Pain Score: 0-No pain  Vitals:  No vitals were obtained today due to virtual visit.      Labs    Urinalysis POC (Office):  Nitrite, UA   Date Value Ref Range  Status   05/16/2021 Negative Negative Final   09/13/2016 Negative Negative Final       Lab Urinalysis:  Blood, UA   Date Value Ref Range Status   05/16/2021 Negative Negative Final   09/13/2016 Negative Negative Final     Nitrite, UA   Date Value Ref Range Status   05/16/2021 Negative Negative Final   09/13/2016 Negative Negative Final     Leukocytes, UA   Date Value Ref Range Status   05/16/2021 Negative Negative Final   09/13/2016 Small (!) Negative Final     pH, UA   Date Value Ref Range Status   05/16/2021 8.0 5.0 - 8.0 Final   09/13/2016 8.0 4.5 - 8.0 Final    and Acute Labs   CBC   WBC   Date Value Ref Range Status   05/16/2021 11.6 (H) 4.0 - 11.0 thou/uL Final   09/28/2016 7.8 4.0 - 11.0 thou/uL Final   09/27/2016 7.5 4.0 - 11.0 thou/uL Final     Hemoglobin   Date Value Ref Range Status   05/16/2021 12.7 12.0 - 16.0 g/dL Final   09/30/2016 11.2 (L) 12.0 - 16.0 g/dL Final   09/28/2016 12.2 12.0 - 16.0 g/dL Final     Platelets   Date Value Ref Range Status   05/16/2021 307 140 - 440 thou/uL Final   09/29/2016 124 (L) 140 - 440 thou/uL Final   09/29/2016 100 (L) 140 - 440 thou/uL Final   , C Reactive Protein    CRP   Date Value Ref Range Status   05/16/2021 0.6 0.0 - 0.8 mg/dL Final    and Renal Panel  [facility]  Creatinine   Date Value Ref Range Status   05/16/2021 0.97 0.60 - 1.10 mg/dL Final   01/20/2021 0.74 0.60 - 1.10 mg/dL Final   02/17/2020 0.67 0.60 - 1.10 mg/dL Final     Potassium   Date Value Ref Range Status   05/16/2021 4.6 3.5 - 5.0 mmol/L Final   01/20/2021 4.0 3.5 - 5.0 mmol/L Final   02/17/2020 4.1 3.5 - 5.0 mmol/L Final     Calcium   Date Value Ref Range Status   05/16/2021 8.9 8.5 - 10.5 mg/dL Final   01/20/2021 8.8 8.5 - 10.5 mg/dL Final   02/17/2020 9.0 8.5 - 10.5 mg/dL Final

## 2021-06-17 NOTE — PATIENT INSTRUCTIONS - HE
Patient Instructions by Yesica Chaves PT at 8/7/2019  9:30 AM     Author: Yesica Chaves PT Service: -- Author Type: Physical Therapist    Filed: 8/7/2019 10:16 AM Encounter Date: 8/7/2019 Status: Signed    : Yesica Chaves PT (Physical Therapist)        HIP ADDUCTION SQUEEZE - SUPINE    Place a rolled up towel, ball or pillow between your knees and press your knees together so that you squeeze the object firmly. Hold 5-10 sec, 5-10 reps, 2x/day           BUTTOCK SQUEEZE    While lying on a firm flat surface with knees bent to 90 degree, squeeze buttocks.   GENTLE, 10 reps, 2 times per day      PELVIC TILT    While lying on your back, use your stomach muscles to press your spine downwards towards the ground. Do not move into a painful position.    Gentle, hold 2-3 sec, 10 reps, 2 times per day

## 2021-06-25 NOTE — PROGRESS NOTES
Progress Notes by Wes Ruiz DO at 5/28/2017  2:30 PM     Author: Wes Ruiz DO Service: -- Author Type: Physician    Filed: 5/29/2017  8:15 AM Encounter Date: 5/28/2017 Status: Signed    : Wes Ruiz DO (Physician)       Chief Complaint   Patient presents with   ? Leg Pain     R/t leg, comes and goes for months     History of Present Illness: Nursing notes reviewed. Patient thinks the pain occurs mostly after stretching from yoga, but since 2 days ago, it persisted even though she has not stretched for two days. She had significant pain in right lateral hip, thigh, and knee last night. She has pain from her right lateral hip to her lateral knee especially with putting right foot on right knee.    Review of systems: See history of present illness, otherwise negative.     Current Outpatient Prescriptions   Medication Sig Dispense Refill   ? cyclobenzaprine (FLEXERIL) 10 MG tablet Take 1 tablet (10 mg total) by mouth 3 (three) times a day as needed for muscle spasms. 15 tablet 0   ? prenatal #115-iron-folic acid 29 mg iron- 1 mg Chew Chew daily.     ? senna-docusate (PERICOLACE) 8.6-50 mg tablet Take 1 tablet by mouth 2 (two) times a day as needed for constipation. 40 tablet 0     No current facility-administered medications for this visit.        Past Medical History:   Diagnosis Date   ? Abnormal Pap smear of cervix 2009      Past Surgical History:   Procedure Laterality Date   ? CYST REMOVAL Right     Wrist   ? TONSILLECTOMY     ? WISDOM TOOTH EXTRACTION        Social History     Social History   ? Marital status: Single     Spouse name: N/A   ? Number of children: N/A   ? Years of education: N/A     Social History Main Topics   ? Smoking status: Never Smoker   ? Smokeless tobacco: Never Used   ? Alcohol use No   ? Drug use: No   ? Sexual activity: Not Asked     Other Topics Concern   ? None     Social History Narrative       History   Smoking Status   ? Never Smoker   Smokeless Tobacco   ?  Never Used      Exam:   Blood pressure 122/84, pulse 85, temperature 98  F (36.7  C), temperature source Oral, resp. rate 16, weight (!) 239 lb 4.8 oz (108.5 kg), SpO2 100 %, unknown if currently breastfeeding.    EXAM:   General: Vital signs reviewed. Patient is in no acute appearing distress when sitting in exam room chair. Breathing is non labored appearing. Patient is alert and oriented x 3.   Back and right lower extremity exam is significant for patient being tender over her right trochanteric bursa region, and right SI joint. She has increased pain in these areas especially with hip adduction, and to a lesser extent with abduction during a supine right hip exam. While sitting, with putting her right foot up on her left knee, she has pain going from her right lateral hip, distally down to her lateral knee. No calf tenderness, edema, or increased temperature.    Assessment/Plan   1. Trochanteric bursitis, right hip  Ambulatory referral to PT/OT   2. Iliotibial band syndrome  cyclobenzaprine (FLEXERIL) 10 MG tablet    Ambulatory referral to PT/OT   3. Sacroiliac joint dysfunction of right side  Ambulatory referral to PT/OT       Patient Instructions     Caution that the cyclobenzaprine may cause drowsiness. You can concurrently use ibuprofen or naproxen for pain relief. Someone will be calling you about the therapy after the weekend.    What is bursitis?  A bursa is a fluid-filled sac that helps cushion the muscles, tendons, and bones around a joint. When a bursa becomes inflamed, its called bursitis. Common symptoms of bursitis include pain, tenderness, and swelling that limits movement of the joint.  What causes bursitis?  Bursitis is most often caused by overuse of a joint. The repeated movements irritate the bursa and may cause it to swell. When that happens, other surrounding tissues may become inflamed or have less space to move. Bursitis is most common in large joints such as the knee, shoulder, and  hip.       Nonsurgical treatment involves both rest and exercise.    How is bursitis treated?  To help reduce pain and swelling, your healthcare provider may recommend one or more of the following:     Rest gives the bursa time to heal. This means limiting activities that put stress on the joint.    Anti-inflammatory medications help reduce painful swelling. In some cases, this can include injections of cortisone or other steroid medicines into the bursa.    Splints and support bandages improve your comfort and allow the bursa to heal.    Physical therapy may be used to increase flexibility and strengthen muscles that support the joint.    Aspiration removes extra fluid from the bursa using a needle. This can help your healthcare provider find out what is causing your bursitis. For example, it might be an infection or overuse.     Surgery can be used to remove an inflamed or infected bursa. This is rarely needed.  Date Last Reviewed: 9/11/2015 2000-2017 [employer]. [address] [place] [address]. All rights reserved. This information is not intended as a substitute for professional medical care. Always follow your healthcare professional's instructions.        Iliotibial Band Stretch (Flexibility)    1. Stand next to a chair. Hold onto the chair with your right hand for support. Cross your right leg behind your left leg.  2. Lean your right hip toward the right. Feel the stretch at the outside of your hip.  3. Hold for 30 to 60 seconds. Then relax.  4. Repeat 2 times, or as instructed.  5. Switch sides and repeat.  6. Do this 3 times a day, or as instructed.     Tip: Dont bend forward or twist at the waist.   Date Last Reviewed: 3/29/2016    2196-2003 [employer]. [address] [place] [address]. All rights reserved. This information is not intended as a substitute for professional medical care. Always follow your healthcare professional's  instructions.        Understanding Iliotibial Band Syndrome  Iliotibial band syndrome, or IT band syndrome, is a condition that causes pain on the outside of the knee. It most often occurs in athletes, especially long-distance runners. It can happen if you cycle, ski, row, or play soccer. It can also occur in people who are starting to exercise.  What is the IT band?  The iliotibial (IT) band is a strong, thick band of tissue that runs down the outside of your thigh. It goes all the way from your hip bones to the top of your shinbone (tibia), just below your knee joint. The bones of your knee joint include your tibia, your thighbone (femur), and your kneecap (patella).  When you bend and straighten your leg, the IT band moves over the outer lower edge of your femur. Over time, bending and straightening your leg can cause the IT band to irritate nearby tissues and cause pain.  What causes IT band syndrome?  Researchers are still learning the exact cause of IT band syndrome. The pain may be caused by the IT band rubbing over the lower outer edge of the femur. This may cause inflammation in the bone, tendons, and small fluid-filled sacs (bursa) in the area. The IT band may also compress the tissue under it and cause pain.  If you are a runner, you might be more likely to develop IT band syndrome if:    You run on uneven or downhill terrain    You run on worn-out shoes    You run many miles per day    Your legs slope a little inward from your knee to your ankle (bowlegs)  Symptoms of IT band syndrome  IT band syndrome causes pain on the outside of the knee. It might affect one or both of your knees. The pain is an aching, burning feeling that can spread up the thigh to the hip. You may feel this pain only when you exercise, such as while running. The pain may be worst right after you step on your foot. It may only happen near the end of your exercise. As the condition gets worse, pain may start earlier and continue  after you have stopped exercising. Going up and down the stairs may make the pain worse.  Diagnosing IT band syndrome  Your doctor will ask about your health history and your symptoms. He or she will give you a physical exam. This will include an exam of your knee. The range of motion and strength of your knee will be tested. The doctor will look for areas of pain around your knee. The symptoms of IT band syndrome can be like those of osteoarthritis or a meniscal tear. Your doctor will need to make sure IT band syndrome is the cause of your symptoms. If the diagnosis is not clear, you may need imaging tests. These can include an X-ray or MRI scan.  Date Last Reviewed: 4/22/2015 2000-2017 [employer]. [address] [place] [address], [place] [address]. All rights reserved. This information is not intended as a substitute for professional medical care. Always follow your healthcare professional's instructions.        Treatment for Iliotibial Band Syndrome  Iliotibial band syndrome, or IT band syndrome, is a condition that causes pain on the outside of the knee. It most often occurs in athletes, especially long-distance runners. It can happen if you cycle, ski, row, or play soccer. It can also occur in people who are starting to exercise.  Types of treatment  Treatment may include:    Avoiding any activity that makes your knee pain worse for a while (like running), and returning to this activity slowly over time    Icing the outside of your knee when it causes you pain    Taking over-the-counter pain medicines    Having corticosteroid injections, to reduce inflammation    Making changes to your activity, like lowering your bicycle seat for cycling or improving your running form    Practicing special exercises to stretch and strengthen the muscles around your hip and your knee  You may find it helpful to work with a physical therapist.  These treatments help most people with IT band syndrome. Your doctor may  advise surgery if you still have severe symptoms after 6 months of other treatment. Your doctor will talk with you about the types of surgery.  Preventing IT band syndrome  You may be able to prevent IT band syndrome if you:    Run on even surfaces    Replace your running shoes often    Ease up on your training    On a track, make sure you run in both directions    Have an expert check your stance for running and other sporting activities    Stretch your outer thigh and hamstrings often  If you are new to exercise, start slowly. Increase your activity over time.  Talk with your doctor or  for more advice.  When to call the healthcare provider  Call your healthcare provider right away if you have any of these:    Symptoms that get worse, or dont get better with treatment    New symptoms   Date Last Reviewed: 8/10/2015    0386-2964 [employer]. [address] [place] [address], [place] [address]. All rights reserved. This information is not intended as a substitute for professional medical care. Always follow your healthcare professional's instructions.           Wes Ruiz,

## 2021-06-26 NOTE — PROGRESS NOTES
Progress Notes by Jorje Lock MD (Ted) at 5/8/2018  1:50 PM     Author: Jorje Lock MD (Ted) Service: -- Author Type: Physician    Filed: 5/8/2018  2:21 PM Encounter Date: 5/8/2018 Status: Signed    : Jorje Lock MD (Ted) (Physician)           Click to link to [facility] HEART CARE NOTE    Thank you, Dr. Gregorio, for asking the [facility] to evaluate Ms. Heather Ramos.      Assessment/Recommendations   Assessment:    Hypertension, likely primary  Heart palpitations, benign, most likely triggered by anxiety  Chest pain, atypical  High BMI    Plan:  I have reviewed EKG and Holter monitor with the patient.  EKG is essentially normal except for borderline short MA interval which has no clinical consequences.  Holter was normal even though she was having symptoms during time of monitoring.    We will perform stress test and echo to rule out structural heart abnormalities.    I spoke to her about importance of regular exercise, proper diet, low-salt intake and weight reduction in order to improve blood pressure.  She will be willing to take antihypertensive medication if necessary.  She is planning to have more children.  She would like to be taking medication which is safe in pregnancy.       History of Present Illness    Ms. Heather Ramos is a 38 y.o. female who comes in for evaluation of elevated blood pressure, chest pain, heart palpitations.  She has had those problems for several months now.  Blood pressure tends to fluctuate from quite high to almost normal readings.  She attributes that to her emotional status.  Similarly heart palpitations occur when she is stressed out.  She denies prolonged heart racing or syncope.  She has no history of known coronary artery disease, valvular heart disease, cardiomyopathy.  She denies exertional chest pain.  She does occasionally get uncomfortable feeling in her chest.  This  feeling comes on at rest.  There is no pleuritic features.    ECG: Personally reviewed.  Normal sinus rhythm WV interval 108 ms no delta wave normal QRS normal QT interval    Holter:  1.  Normal Holter monitor.  2.  Symptoms of dizziness do not correlate to arrhythmias or electrocardiographic  changes.     Physical Examination Review of Systems   Vitals:    05/08/18 1349   BP: 118/88   Pulse: 91   Resp: 20     Body mass index is 36.49 kg/(m^2).  Wt Readings from Last 3 Encounters:   05/08/18 (!) 233 lb (105.7 kg)   05/28/17 (!) 239 lb 4.8 oz (108.5 kg)   09/29/16 (!) 239 lb (108.4 kg)     General Appearance:   Alert, cooperative, no distress, appears stated age   Head/ENT: Normocephalic, without obvious abnormality. Membranes moist      EYES:  no scleral icterus, normal conjunctivae   Neck: Supple, symmetrical, trachea midline, no adenopathy, thyroid: not enlarged, symmetric, no carotid bruit or JVD   Chest/Lungs:   Lungs are clear to auscultation, respirations unlabored. No tenderness or deformity    Cardiovascular:   Regular rhythm, S1, S2 normal, no murmur, rub or gallop.   Abdomen:  Soft, non-tender, bowel sounds active all four quadrants,  no masses, no organomegaly   Extremities: no cyanosis or clubbing. No edema   Skin: Skin color, texture, turgor normal, no rashes or lesions.    Psychiatric: Normal affect, calm   Neurologic: Alert and oriented x 3, moving all four extremities.     General: WNL  Eyes: Visual Distubance  Ears/Nose/Throat: WNL  Lungs: Shortness of Breath  Heart: Chest Pain, Shortness of Breath with activity, Irregular Heartbeat  Stomach: WNL  Bladder: WNL  Muscle/Joints: WNL  Skin: WNL  Nervous System: WNL  Mental Health: Confusion     Blood: Easy Bruising     Medical History  Surgical History Family History Social History   Past Medical History:   Diagnosis Date   ? Abnormal Pap smear of cervix 2009    Past Surgical History:   Procedure Laterality Date   ? CYST REMOVAL Right     Wrist   ?  TONSILLECTOMY     ? WISDOM TOOTH EXTRACTION      Mother started to have some heart problems and  mid 60s Social History     Social History   ? Marital status: Single     Spouse name: N/A   ? Number of children: N/A   ? Years of education: N/A     Occupational History   ? Not on file.     Social History Main Topics   ? Smoking status: Never Smoker   ? Smokeless tobacco: Never Used   ? Alcohol use No   ? Drug use: No   ? Sexual activity: Not on file     Other Topics Concern   ? Not on file     Social History Narrative          Medications  Allergies   Current Outpatient Prescriptions   Medication Sig Dispense Refill   ? senna-docusate (PERICOLACE) 8.6-50 mg tablet Take 1 tablet by mouth 2 (two) times a day as needed for constipation. 40 tablet 0     No current facility-administered medications for this visit.       Allergies   Allergen Reactions   ? Amoxicillin    ? Penicillins          Lab Results    Chemistry/lipid CBC Cardiac Enzymes/BNP/TSH/INR   Lab Results   Component Value Date    CREATININE 0.72 2018    BUN 14 2018    K 3.9 2018     2018     2018    CO2 25 2018    Lab Results   Component Value Date    WBC 7.8 2016    HGB 11.2 (L) 2016    HCT 34.8 (L) 2016    MCV 88 2016     (L) 2016    Lab Results   Component Value Date    TSH 2.08 2018    INR 0.98 2016

## 2021-06-26 NOTE — PROGRESS NOTES
Progress Notes by Alaina Shell CNP at 12/3/2018  2:20 PM     Author: Alaina Shell CNP Service: -- Author Type: Nurse Practitioner    Filed: 2018 10:07 AM Encounter Date: 12/3/2018 Status: Signed    : Alaina Shell CNP (Nurse Practitioner)       Chief Complaint   Patient presents with   ? Sore Throat     fatigue, headache x 2 days       ASSESSMENT & PLAN:   Diagnoses and all orders for this visit:    Viral syndrome      MDM:  Unlikely strep in this 38-year-old female given that mild sore throat started the same time as cough and has been present x 1 day.  Will treat as viral syndrome.    Supportive care discussed.  See discharge instructions below for specific recommendations given.    At the end of the encounter, I discussed results, diagnosis, medications. Discussed red flags for immediate return to clinic/ER, as well as indications for follow up if no improvement. Patient and/or caregiver understood and agreed to plan. Patient was stable for discharge.    SUBJECTIVE    HPI:  Cough with raspy voice x 1 day.  Here with infant son who is also ill with cough.      Mild sore throat.  Feels like tongue is swollen.  Hx tonsillectomy.              History obtained from the patient.    Past Medical History:   Diagnosis Date   ? Abnormal Pap smear of cervix        Problem List:  2018: Precordial pain  2016: Status post   2016: Pregnant      Social History     Tobacco Use   ? Smoking status: Never Smoker   ? Smokeless tobacco: Never Used   Substance Use Topics   ? Alcohol use: No       Review of Systems   Constitutional: Negative for appetite change, chills and fever.   HENT: Positive for sore throat and voice change.    Respiratory: Positive for cough. Negative for shortness of breath.    Gastrointestinal: Negative for nausea and vomiting.   Skin: Negative for rash.       OBJECTIVE    Vitals:    18 1450   BP: 138/82   Pulse: 98   Resp: 18   Temp: 98.6  F (37  C)  "  TempSrc: Oral   SpO2: 99%   Weight: (!) 240 lb 4.8 oz (109 kg)       Physical Exam   Constitutional: She is oriented to person, place, and time. She appears well-developed and well-nourished. No distress.   HENT:   Right Ear: External ear normal.   Left Ear: External ear normal.   Mouth/Throat: Mucous membranes are normal. Posterior oropharyngeal erythema present. No oropharyngeal exudate or tonsillar abscesses.   Eyes: Conjunctivae are normal. Right eye exhibits no discharge. Left eye exhibits no discharge.   Cardiovascular: Normal rate, regular rhythm, normal heart sounds and intact distal pulses.   Pulmonary/Chest: Effort normal and breath sounds normal.   Musculoskeletal: Normal range of motion.   Neurological: She is alert and oriented to person, place, and time.   Skin: Skin is warm and dry. Capillary refill takes less than 2 seconds.   Psychiatric: She has a normal mood and affect. Her behavior is normal. Judgment and thought content normal.       Labs:  No results found for this or any previous visit (from the past 240 hour(s)).      Radiology:    No results found.    PATIENT INSTRUCTIONS:   Patient Instructions     Patient Education     Viral Syndrome (Adult)  A viral illness may cause a number of symptoms. The symptoms depend on the part of the body that the virus affects. If it settles in your nose, throat, and lungs, it may cause cough, sore throat, congestion, and sometimes headache. If it settles in your stomach and intestinal tract, it may cause vomiting and diarrhea. Sometimes it causes vague symptoms like \"aching all over,\" feeling tired, loss of appetite, or fever.  A viral illness usually lasts 1 to 2 weeks, but sometimes it lasts longer. In some cases, a more serious infection can look like a viral syndrome in the first few days of the illness. You may need another exam and additional tests to know the difference. Watch for the warning signs listed below.  Home care  Follow these guidelines for " taking care of yourself at home:    If symptoms are severe, rest at home for the first 2 to 3 days.    Stay away from cigarette smoke - both your smoke and the smoke from others.    You may use over-the-counter acetaminophen or ibuprofen for fever, muscle aching, and headache, unless another medicine was prescribed for this. If you have chronic liver or kidney disease or ever had a stomach ulcer or GI bleeding, talk with your doctor before using these medicines. No one who is younger than 18 and ill with a fever should take aspirin. It may cause severe disease or death.    Your appetite may be poor, so a light diet is fine. Avoid dehydration by drinking 8 to 12 8-ounce glasses of fluids each day. This may include water; orange juice; lemonade; apple, grape, and cranberry juice; clear fruit drinks; electrolyte replacement and sports drinks; and decaffeinated teas and coffee. If you have been diagnosed with a kidney disease, ask your doctor how much and what types of fluids you should drink to prevent dehydration. If you have kidney disease, drinking too much fluid can cause it build up in the your body and be dangerous to your health.    Over-the-counter remedies won't shorten the length of the illness but may be helpful for cough, sore throat; and nasal and sinus congestion. Don't use decongestants if you have high blood pressure.  Follow-up care  Follow up with your healthcare provider if you do not improve over the next week.  Call 911  Call 911 if any of the following occur:    Convulsion    Feeling weak, dizzy, or like you are going to faint    Chest pain, shortness of breath, wheezing, or difficulty breathing  When to seek medical advice  Call your healthcare provider right away if any of these occur:    Cough with lots of colored sputum (mucus) or blood in your sputum    Chest pain, shortness of breath, wheezing, or difficulty breathing    Severe headache; face, neck, or ear pain    Severe, constant pain in  the lower right side of your belly (abdominal)    Continued vomiting (cant keep liquids down)    Frequent diarrhea (more than 5 times a day); blood (red or black color) or mucus in diarrhea    Feeling weak, dizzy, or like you are going to faint    Extreme thirst    Fever of 100.4 F (38 C) or higher, or as directed by your healthcare provider  Date Last Reviewed: 9/25/2015 2000-2017 [employer]. [address] [place] [address]. All rights reserved. This information is not intended as a substitute for professional medical care. Always follow your healthcare professional's instructions.

## 2021-06-27 NOTE — PROGRESS NOTES
Progress Notes by Wes Ruiz DO at 8/5/2019 12:40 PM     Author: Wes Ruiz DO Service: -- Author Type: Physician    Filed: 8/5/2019  4:14 PM Encounter Date: 8/5/2019 Status: Signed    : Wes Ruiz DO (Physician)       Chief Complaint   Patient presents with   ? Hip Pain     left hip lock after returning from vacation, returned from vacation on 6/31/19. pt states can not left left leg into bed.         History of Present Illness: Rooming staff notes reviewed.  Chief concern of patient is left lower back discomfort since the end of this past June.  While on a vacation, patient first noted pain when getting out of bed in hotel room. Since then, the discomfort has been worsening, being the worst starting yesterday. The pain is worse with turing of body, not bad when walking.  She cannot recall any one specific injury, but she does spend a lot of time carrying around her child.    Review of systems: See history of present illness, all others negative.     Current Outpatient Medications   Medication Sig Dispense Refill   ? cyclobenzaprine (FLEXERIL) 5 MG tablet Take 1 tablet (5 mg total) by mouth 3 (three) times a day as needed for muscle spasms. 20 tablet 0   ? labetalol (TRANDATE; NORMODYNE) 100 MG tablet Take 1 tablet (100 mg total) by mouth 2 (two) times a day. 60 tablet 11   ? senna-docusate (PERICOLACE) 8.6-50 mg tablet Take 1 tablet by mouth 2 (two) times a day as needed for constipation. 40 tablet 0     No current facility-administered medications for this visit.      Past Medical History:   Diagnosis Date   ? Abnormal Pap smear of cervix 2009      Past Surgical History:   Procedure Laterality Date   ? CYST REMOVAL Right     Wrist   ? TONSILLECTOMY     ? WISDOM TOOTH EXTRACTION        Social History     Tobacco Use   ? Smoking status: Never Smoker   ? Smokeless tobacco: Never Used   Substance Use Topics   ? Alcohol use: No   ? Drug use: No        No family history on file.    Vitals:     08/05/19 1246   BP: (!) 153/102   Patient Site: Right Arm   Patient Position: Sitting   Cuff Size: Adult Large   Pulse: (!) 104   Temp: 98.5  F (36.9  C)   TempSrc: Oral   SpO2: 98%   Weight: (!) 250 lb 3 oz (113.5 kg)     Wt Readings from Last 3 Encounters:   08/05/19 (!) 250 lb 3 oz (113.5 kg)   12/03/18 (!) 240 lb 4.8 oz (109 kg)   05/23/18 (!) 233 lb (105.7 kg)     Temp Readings from Last 3 Encounters:   08/05/19 98.5  F (36.9  C) (Oral)   12/03/18 98.6  F (37  C) (Oral)   05/28/17 98  F (36.7  C) (Oral)     BP Readings from Last 3 Encounters:   08/05/19 (!) 153/102   12/03/18 138/82   05/23/18 120/90     Pulse Readings from Last 3 Encounters:   08/05/19 (!) 104   12/03/18 98   05/23/18 80     EXAM:   General: Vital signs reviewed.  Patient is in no acute appearing distress while sitting in exam room chair.  Breathing appears nonlabored.  Patient is alert and oriented ×3.  She tends to get up and down from exam room chair and table slowly, especially having difficulty getting up from exam room table due to left sacroiliac area discomfort.    Palpatory back exam is notable for tenderness over her left SI joint region.  No palpable deformity in this area.  With standing range of motion testing, she has normal range of motion in forward flexion, backward extension, side bending, and rotation, with left sacroiliac area discomfort with movement in all of these directions except for right side bending.  No radiation of discomfort into lower extremities.  Left straight leg raising test showed discomfort in the left sacroiliac joint area after raising approximately 60 degrees with no radiation of discomfort down left lower extremity.  Left hip exam showed left sacroiliac joint discomfort with active flexion of left hip, especially against resistance.  Bilateral hip exam was otherwise unremarkable.    Assessment/Plan   1. Acute left-sided low back pain without sciatica  cyclobenzaprine (FLEXERIL) 5 MG tablet     Ambulatory referral to PT/OT   2. Sacroiliac joint pain  cyclobenzaprine (FLEXERIL) 5 MG tablet    Ambulatory referral to PT/OT       Patient Instructions     Caution that the cyclobenzaprine may cause drowsiness. Try OTC ibuprofen or naproxen for pain relief also, and a cold pack to the area of discomfort.    Patient Education     Self-Care for Low Back Pain    Most people have low back pain now and then. In many cases, it isnt serious and self-care can help. Sometimes low back pain can be a sign of a bigger problem. Call your healthcare provider if your pain returns often or gets worse over time. For the long-term care of your back, get regular exercise, lose any excess weight and learn good posture.  Take a short rest  Lying down during the day may be beneficial for short periods of time if severe pain increases with sitting or standing. Long-term bed rest could be detrimental.  Reduce pain and swelling  Cold reduces swelling. Both cold and heat can reduce pain. Protect your skin by placing a towel between your body and the ice or heat source.    For the first few days, apply an ice pack for 15 to 20 minutes .    After the first few days, try heat for 15 minutes at a time to ease pain. Never sleep on a heating pad.    Over-the-counter medicine can help control pain and swelling. Try aspirin or ibuprofen.  Exercise  Exercise can help your back heal. It also helps your back get stronger and more flexible, preventing any reinjury. Ask your healthcare provider about specific exercises for your back.  Use good posture to avoid reinjury    When moving, bend at the hips and knees. Dont bend at the waist or twist around.    When lifting, keep the object close to your body. Dont try to lift more than you can handle.    When sitting, keep your lower back supported. Use a rolled-up towel as needed.  Seek immediate medical care if:    Youre unable to stand or walk.    You have a temperature over 100.4 F (38.0 C)    You have  frequent, painful, or bloody urination.    You have severe abdominal pain.    You have a sharp, stabbing pain.    Your pain is constant.    You have pain or numbness in your leg.    You feel pain in a new area of your back.    You notice that the pain isnt decreasing after more than a week.   Date Last Reviewed: 9/29/2015 2000-2017 [employer]. [address] [place] [address]. All rights reserved. This information is not intended as a substitute for professional medical care. Always follow your healthcare professional's instructions.              Wes Ruiz,

## 2021-09-10 ENCOUNTER — LAB REQUISITION (OUTPATIENT)
Dept: LAB | Facility: CLINIC | Age: 41
End: 2021-09-10

## 2021-09-10 DIAGNOSIS — M25.471 EFFUSION, RIGHT ANKLE: ICD-10-CM

## 2021-09-10 LAB
ALBUMIN SERPL-MCNC: 3.6 G/DL (ref 3.5–5)
ALP SERPL-CCNC: 96 U/L (ref 45–120)
ALT SERPL W P-5'-P-CCNC: 12 U/L (ref 0–45)
ANION GAP SERPL CALCULATED.3IONS-SCNC: 7 MMOL/L (ref 5–18)
AST SERPL W P-5'-P-CCNC: 15 U/L (ref 0–40)
BILIRUB SERPL-MCNC: 0.4 MG/DL (ref 0–1)
BUN SERPL-MCNC: 13 MG/DL (ref 8–22)
CALCIUM SERPL-MCNC: 9.1 MG/DL (ref 8.5–10.5)
CHLORIDE BLD-SCNC: 106 MMOL/L (ref 98–107)
CO2 SERPL-SCNC: 28 MMOL/L (ref 22–31)
CREAT SERPL-MCNC: 0.79 MG/DL (ref 0.6–1.1)
GFR SERPL CREATININE-BSD FRML MDRD: >90 ML/MIN/1.73M2
GLUCOSE BLD-MCNC: 94 MG/DL (ref 70–125)
POTASSIUM BLD-SCNC: 4.4 MMOL/L (ref 3.5–5)
PROT SERPL-MCNC: 6.5 G/DL (ref 6–8)
SODIUM SERPL-SCNC: 141 MMOL/L (ref 136–145)

## 2021-09-10 PROCEDURE — 80053 COMPREHEN METABOLIC PANEL: CPT | Performed by: NURSE PRACTITIONER

## 2021-09-10 PROCEDURE — 36415 COLL VENOUS BLD VENIPUNCTURE: CPT | Performed by: NURSE PRACTITIONER

## 2022-02-22 ENCOUNTER — TRANSFERRED RECORDS (OUTPATIENT)
Dept: HEALTH INFORMATION MANAGEMENT | Facility: CLINIC | Age: 42
End: 2022-02-22

## 2022-02-22 ENCOUNTER — HOSPITAL ENCOUNTER (EMERGENCY)
Facility: CLINIC | Age: 42
Discharge: HOME | End: 2022-02-22
Attending: EMERGENCY MEDICINE | Admitting: EMERGENCY MEDICINE
Payer: COMMERCIAL

## 2022-02-22 VITALS
SYSTOLIC BLOOD PRESSURE: 165 MMHG | OXYGEN SATURATION: 100 % | HEART RATE: 93 BPM | DIASTOLIC BLOOD PRESSURE: 95 MMHG | TEMPERATURE: 98.3 F | RESPIRATION RATE: 16 BRPM

## 2022-02-22 DIAGNOSIS — I10 PRIMARY HYPERTENSION: Primary | ICD-10-CM

## 2022-02-22 DIAGNOSIS — Z91.148 NONCOMPLIANCE WITH MEDICATION REGIMEN: ICD-10-CM

## 2022-02-22 PROCEDURE — 250N000013 HC RX MED GY IP 250 OP 250 PS 637

## 2022-02-22 PROCEDURE — 99283 EMERGENCY DEPT VISIT LOW MDM: CPT

## 2022-02-22 RX ORDER — HYDRALAZINE HYDROCHLORIDE 20 MG/ML
10 INJECTION INTRAMUSCULAR; INTRAVENOUS ONCE
Status: CANCELLED | OUTPATIENT
Start: 2022-02-22 | End: 2022-02-22

## 2022-02-22 RX ADMIN — HYDROCHLOROTHIAZIDE: 12.5 CAPSULE ORAL at 17:20

## 2022-02-22 NOTE — DISCHARGE INSTRUCTIONS
Take your medicine every day. It can be tricky to remember, so find a trick to help you remember that works best for you. (Ex. Reminder sticky notes, phone alarms, pill organizers)    With daily medicine use, your blood pressure should go down within a few days to 1 week.     If you experience headache, vision changes, foamy urine, impaired movement, word-finding difficulty, or changes in sensation, please come back to the ED.    Thanks for stopping by, and stay well!    -April Florez MD

## 2022-02-22 NOTE — ED PROVIDER NOTES
Emergency Department Midlevel Supervisory Note     I personally saw the patient and performed a substantive portion of the visit including all aspects of the medical decision making.    ED Course:  4:42 PM Dr. April Florez, resident physician, staffed patient with me. I agree with their assessment and plan of management, and I will see the patient.  4:54 PM  I met with the patient to introduce myself, gather additional history, perform my initial exam, and discuss the plan.     Brief HPI:     Heather Ramos is a 41 year old female who presents for evaluation of hypertension.    Patient reports that she has a history of hypertension, but has not taken her Hyzaar for the past 1-2 months. She has a difficult time remembering to take her medicine as she sometimes works swing shift and this can throw off her ability to take medicines on a timely manner. Otherwise denies any headache, vision changes, back pain, sensory or motor changes, speech difficulty, numbness, tingling in hands or feet, or any other complaints.    I, Mikki Dick, am serving as a scribe to document services personally performed by Dr. Baldwin, based on my observations and the provider's statements to me.   I, Aureliano Baldwin MD, attest that Mikki Dick was acting in a scribe capacity, has observed my performance of the services and has documented them in accordance with my direction.    Brief Physical Exam:   Constitutional:  Alert, in no acute distress  EYES: Conjunctivae clear  HENT:  Atraumatic, normocephalic  Respiratory:  Respirations even, unlabored, in no acute respiratory distress  Cardiovascular:  Regular rate and rhythm, good peripheral perfusion  GI: Soft, nondistended, nontender, no palpable masses, no rebound, no guarding   Musculoskeletal:  No edema. No cyanosis. Range of motion major extremities intact.    Integument: Warm, Dry, No erythema, No rash.   Neurologic:  Alert & oriented, no focal deficits noted  Psych: Normal  mood and affect     MDM:  ED Course as of 02/22/22 1721   Tue Feb 22, 2022 1720 Patient is a 41-year-old woman who presents with retention, sent in from clinic after amenorrhea work-up.  Arrival she is 189/119, on recheck without intervention she is 165/95.  No sign of endorgan dysfunction such as headache, blurred vision, dizziness, chest pain.  No indication for emergent work-up.  She has not taken her home blood pressure medications.  She was given a dose of those here, will restart them tomorrow.       1. Primary hypertension    2. Noncompliance with medication regimen        Labs and Imaging:     I have reviewed the relevant laboratory and radiology studies    Procedures:  I was present for the key portions of this procedure: none    Aureliano Baldwin MD  [facility] EMERGENCY ROOM  [address] [place] [id number] [phone]     Aureliano Baldwin MD  02/22/22 1721

## 2022-02-22 NOTE — ED TRIAGE NOTES
Pt arrives to ED with c/o high blood pressure was sent here was clinic for evaluation. Pressure of 189/119 in triage. No chest pain or other complaints. Pt endorses she is suppose to be on blood pressure medication but does not take it.

## 2022-02-22 NOTE — ED PROVIDER NOTES
Emergency Department Encounter     Evaluation Date & Time:   2/22/2022  4:09 PM    CHIEF COMPLAINT:  Hypertension      Triage Note:Pt arrives to ED with c/o high blood pressure was sent here was clinic for evaluation. Pressure of 189/119 in triage. No chest pain or other complaints. Pt endorses she is suppose to be on blood pressure medication but does not take it.         Impression and Plan     ED COURSE & MEDICAL DECISION MAKING:    Patient came to the ED from her PCP clinic following elevated blood pressure in the high 180s.  Patient reports that she has compliance issues with daily medicine.  Has not taken for the past 1 to 2 months.  Patient has no clinical signs of hypertensive urgency.  Blood pressure came down to 160s/90s in the ED.  Appropriate to send patient home with dose of home Hyzaar.  Provided instruction for patient on medication compliance.    At the conclusion of the encounter I discussed the results of all the tests and the disposition. The questions were answered. The patient or family acknowledged understanding and was agreeable with the care plan.    FINAL IMPRESSION:  No diagnosis found.    0 minutes of critical care time    Reassessments & Consults       MEDICATIONS GIVEN IN THE EMERGENCY DEPARTMENT:  Medications   losartan-hydrochlorothiazide (HYZAAR) 100-12.5 mg combo dose (has no administration in time range)       NEW PRESCRIPTIONS STARTED AT TODAY'S ED VISIT:  New Prescriptions    No medications on file       HPI     HPI     Heather Ramos is a 41 year old female with a pertinent history of HTN who presents to this ED from PCP clinic for evaluation of elevated bp.  Shayy came from her PCP clinic with a blood pressure in the high 180s.  PCP wanted her evaluated in the ED.    Per patient report, she has a history of hypertension, but has not taken her Hyzaar for the past month to 2 months.  Patient reports she has a difficult time remembering to take her medicine as she sometimes  works swing shift and this can throw off her ability to take medicines on a timely manner.  At this time she is not experiencing any headache, vision changes, back pain, foamy urine, sensory deficits, motor deficits, word finding difficulty, numbness, or tingling in hands or feet.    Family history is significant for a mother with 2 MIs in her 60s shortly before she passed away.  No family history of stroke.    Patient does not smoke, vape, use marijuana, cocaine or other recreational drugs.    REVIEW OF SYSTEMS:  Review of Systems  Negative for fever, chills, headache, vision changes, cough, chest pain, heart rotations, abdominal pain, nausea, vomiting, changes in bowel or urinary habits, tingling in hands or feet.      Medical History     Past Medical History:   Diagnosis Date     Abnormal Pap smear of cervix 2009     Hypertension        Past Surgical History:   Procedure Laterality Date     CYST REMOVAL Right     Wrist     TONSILLECTOMY       WISDOM TOOTH EXTRACTION         History reviewed. No pertinent family history.    Social History     Tobacco Use     Smoking status: Never Smoker     Smokeless tobacco: Never Used   Substance Use Topics     Alcohol use: No     Drug use: No       losartan-hydrochlorothiazide (HYZAAR) 50-12.5 mg per tablet        Physical Exam     First Vitals:  Patient Vitals for the past 24 hrs:   BP Temp Temp src Pulse Resp SpO2   02/22/22 1610 (!) 186/97 -- -- 95 -- 97 %   02/22/22 1604 (!) 189/119 98.3  F (36.8  C) Oral 98 16 97 %       PHYSICAL EXAM:   Physical Exam  General: Alert, engaged, female who appears stated age, obese body habitus  Cardiovascular: Regular rate and rhythm, no murmurs on auscultation,   Pulmonary: Clear to auscultation, no wheezes, rales, rhonchi  Extremities: No swelling, redness, pulses +3 bilateral upper extremities, capillary refill less than 2 seconds  Neuro: Alert, oriented x3, no motor deficits, no sensory deficits, no word finding difficulty, no  aphasia    Results     LAB:  All pertinent labs reviewed and interpreted  Labs Ordered and Resulted from Time of ED Arrival to Time of ED Departure - No data to display    RADIOLOGY:  No orders to display              ECG:  None    PROCEDURES:  Procedures:  None    [facility] Documentation               CMS Diagnoses:           April Florez MD  Emergency Medicine  [facility] EMERGENCY ROOM       April Florez MD  Resident  02/22/22 5146

## 2022-02-23 ENCOUNTER — PATIENT OUTREACH (OUTPATIENT)
Dept: CARE COORDINATION | Facility: CLINIC | Age: 42
End: 2022-02-23
Payer: COMMERCIAL

## 2022-02-23 DIAGNOSIS — Z71.89 OTHER SPECIFIED COUNSELING: ICD-10-CM

## 2022-02-23 NOTE — PROGRESS NOTES
Clinic Care Coordination Contact      Patient identified for care management outreach, however patient is not on a value based contract so cannot complete outreach. Will escalate to clinic staff if specific needs or resources are indicated.    KARUNA Tai/JOSH Care Coordination Supervisor   [facility] - Care Coordination   2/23/2022 9:33 AM  [phone]

## 2022-03-03 ENCOUNTER — TRANSFERRED RECORDS (OUTPATIENT)
Dept: HEALTH INFORMATION MANAGEMENT | Facility: CLINIC | Age: 42
End: 2022-03-03

## 2022-03-14 ENCOUNTER — TRANSFERRED RECORDS (OUTPATIENT)
Dept: HEALTH INFORMATION MANAGEMENT | Facility: CLINIC | Age: 42
End: 2022-03-14

## 2022-05-28 ENCOUNTER — HOSPITAL ENCOUNTER (EMERGENCY)
Facility: HOSPITAL | Age: 42
Discharge: HOME | End: 2022-05-28
Attending: STUDENT IN AN ORGANIZED HEALTH CARE EDUCATION/TRAINING PROGRAM | Admitting: STUDENT IN AN ORGANIZED HEALTH CARE EDUCATION/TRAINING PROGRAM
Payer: COMMERCIAL

## 2022-05-28 VITALS
RESPIRATION RATE: 18 BRPM | DIASTOLIC BLOOD PRESSURE: 90 MMHG | WEIGHT: 286.6 LBS | SYSTOLIC BLOOD PRESSURE: 181 MMHG | OXYGEN SATURATION: 97 % | TEMPERATURE: 97.9 F | BODY MASS INDEX: 43.58 KG/M2 | HEART RATE: 95 BPM

## 2022-05-28 DIAGNOSIS — S05.01XA ABRASION OF RIGHT CORNEA, INITIAL ENCOUNTER: ICD-10-CM

## 2022-05-28 PROCEDURE — 250N000009 HC RX 250: Performed by: STUDENT IN AN ORGANIZED HEALTH CARE EDUCATION/TRAINING PROGRAM

## 2022-05-28 PROCEDURE — 99283 EMERGENCY DEPT VISIT LOW MDM: CPT

## 2022-05-28 RX ORDER — POLYMYXIN B SULFATE AND TRIMETHOPRIM 1; 10000 MG/ML; [USP'U]/ML
1-2 SOLUTION OPHTHALMIC EVERY 6 HOURS
Qty: 3 ML | Refills: 0 | Status: SHIPPED | OUTPATIENT
Start: 2022-05-28 | End: 2022-06-04

## 2022-05-28 RX ORDER — TETRACAINE HYDROCHLORIDE 5 MG/ML
1-2 SOLUTION OPHTHALMIC ONCE
Status: COMPLETED | OUTPATIENT
Start: 2022-05-28 | End: 2022-05-28

## 2022-05-28 RX ADMIN — TETRACAINE HYDROCHLORIDE 1 DROP: 5 SOLUTION OPHTHALMIC at 17:39

## 2022-05-28 RX ADMIN — FLUORESCEIN SODIUM 1 STRIP: 1 STRIP OPHTHALMIC at 17:40

## 2022-05-28 NOTE — ED NOTES
Pt reports a bug flew into her eye and feels like it never came out like it is behind her eye still.  Pt was seen at  and reports no scratches/abrasions noted so sent to ER for further follow up/exam.  Pt has notable redness to right eye and spot to outer mid eye where it appears bug may have landed when flew into her eye.

## 2022-05-28 NOTE — ED TRIAGE NOTES
She says a bug flew into her right eye last night and she feels never flew out. She went to urgent care who could not see anything and they sent her here. She says she is having pain in her right eye and feels pressure.

## 2022-05-28 NOTE — ED PROVIDER NOTES
"  Emergency Department Encounter         FINAL IMPRESSION:  Corneal abrasion        ED COURSE AND MEDICAL DECISION MAKING       ED Course as of 05/28/22 1808   Sat May 28, 2022   1612 Patient is a 42-year-old female healthy with history of hypertension, here from home with right eye pain.  Patient was seen in urgent care recently/prior and was sent here for \"a more thorough eye examination.\"  Patient states she was staying there and they did not find anything other did take a few small bits of bug out of her eye.  Patient states she was hit by a bug in her right eye last night.  States it feels like it \"crawled to the back of my eye.\"  Arrival she looks well.  The right eye does appear irritated there is areas of subconjunctival injection as well as hemorrhage.  She has some chemosis as well.  Her vision she states is blurry due to the fact that she received medications at the urgent care.  No fevers.  Left eye is normal.  She states that at times she has pain behind her right eye.  I was able to fluorescein stain patient's eye with a Woods lamp I do appreciate a small corneal defect on the lateral aspect  Near the 9 o'clock position suggesting corneal abrasion.  I flipped the patient's eyelid multiple times and could not appreciate any corneal abrasion under the Woods lamp as well as under regular white light to see any foreign body.  I checked patient's eye pressures in both eyes and they were all below 20.  I suspect corneal abrasion of the right eye.  She was given Polytrim eyedrops as well as outpatient follow-up with ophthalmology as needed.    5:20 PM I introduced myself to the patient, obtained patient history, performed a physical exam, and discussed plan for ED workup including potential diagnostic laboratory/imaging studies and interventions.  5:52 PM Rechecked and updated the patient. We discussed the plan for discharge and the patient is agreeable. Reviewed supportive cares, symptomatic treatment, " "outpatient follow up, and reasons to return to the Emergency Department. Patient to be discharged by ED RN.                          At the conclusion of the encounter I discussed the results of all the tests and the disposition. The questions were answered. The patient or family acknowledged understanding and was agreeable with the care plan.                  MEDICATIONS GIVEN IN THE EMERGENCY DEPARTMENT:  Medications   tetracaine (PONTOCAINE) 0.5 % ophthalmic solution 1-2 drop (1 drop Right Eye Given by Other Clinician 5/28/22 0512)   fluorescein (FUL-RONDA) ophthalmic strip 1 strip (1 strip Right Eye Given by Other Clinician 5/28/22 8123)       NEW PRESCRIPTIONS STARTED AT TODAY'S ED VISIT:  Discharge Medication List as of 5/28/2022  5:56 PM      START taking these medications    Details   trimethoprim-polymyxin b (POLYTRIM) 98218-1.1 UNIT/ML-% ophthalmic solution Place 1-2 drops into the right eye every 6 hours for 7 days, Disp-3 mL, R-0, Local Print             HPI     Patient information obtained from: patient    Use of Interpretor: N/A    Heather Ramos is a 42 year old female with a pertinent history of hypertension, anxiety, allergic rhinitis who presents to this ED by walk in for evaluation of eye pain. Patient reports that last night she was walking in her garden when a bug flew into her right eye. She reports that she feels like it \"burrowed in the back\" and is \"still in there\". Patient was seen at Urgent Care where she was given numbing drops and fluorescent dye; she was not given antibiotics and was directed to the ED for further evaluation. Patient does not have any other complaints at this time.        REVIEW OF SYSTEMS:  Review of Systems   Constitutional: Negative for fever, malaise  HEENT: Negative runny nose, sore throat, ear pain, neck pain, positive for eye pain  Respiratory: Negative for shortness of breath, cough, congestion  Cardiovascular: Negative for chest pain, leg " edema  Gastrointestinal: Negative for abdominal distention, abdominal pain, constipation, vomiting, nausea, diarrhea  Genitourinary: Negative for dysuria and hematuria.   Integument: Negative for rash, skin breakdown  Neurological: Negative for paresthesias, weakness, headache.  Musculoskeletal: Negative for joint pain, joint swelling      All other systems reviewed and are negative.          MEDICAL HISTORY     Past Medical History:   Diagnosis Date     Abnormal Pap smear of cervix 2009     Hypertension        Past Surgical History:   Procedure Laterality Date     CYST REMOVAL Right     Wrist     TONSILLECTOMY       WISDOM TOOTH EXTRACTION         Social History     Tobacco Use     Smoking status: Never Smoker     Smokeless tobacco: Never Used   Substance Use Topics     Alcohol use: No     Drug use: No       trimethoprim-polymyxin b (POLYTRIM) 05683-5.1 UNIT/ML-% ophthalmic solution  losartan-hydrochlorothiazide (HYZAAR) 50-12.5 mg per tablet            PHYSICAL EXAM     BP (!) 181/90   Pulse 95   Temp 97.9  F (36.6  C) (Temporal)   Resp 18   Wt 130 kg (286 lb 9.6 oz)   SpO2 97%   BMI 43.58 kg/m        PHYSICAL EXAM:     General: Patient appears well, nontoxic, comfortable  HEENT: Moist mucous membranes, no tongue swelling.  No head trauma.  No midline neck pain. The right eye does appear irritated there is areas of subconjunctival injection as well as hemorrhage.  She has some chemosis as well.  Her vision she states is blurry due to the fact that she received medications at the urgent care.  No fevers.  Left eye is normal.  Cardiovascular: Normal rate, normal rhythm, no extremity edema.  No appreciable murmur.  Respiratory: No signs of respiratory distress, lungs are clear to auscultation bilaterally with no wheezes rhonchi or rales.  Abdominal: Soft, nontender, nondistended, no palpable masses, no guarding, no rebound  Musculoskeletal: Full range of motion of joints, no deformities  appreciated.  Neurological: Alert and oriented, grossly neurologically intact.  Psychological: Normal affect and mood.  Integument: No rashes appreciated          RESULTS       Labs Ordered and Resulted from Time of ED Arrival to Time of ED Departure - No data to display    No orders to display                     PROCEDURES:  Procedures:  Procedures     PROCEDURE: Woods lamp Exam   INDICATIONS: Right eye pain   PROCEDURE PROVIDER: Dr Darek Gaines   SITE: right eye   CONSENT:  The risks, benefits and alternatives for this procedure were explained to the patient and verbally accepted.     MEDICATION: fluorescein stain and tetracaine   EXAM FINDINGS: Right Eye: I was able to fluorescein stain patient's eye with a Woods lamp I do appreciate a small corneal defect on the lateral aspect near the 9 o'clock position suggesting corneal abrasion.    Left Eye:   COMPLICATIONS: Patient tolerated procedure well, without complication       IWaylon am serving as a scribe to document services personally performed by Darek Gaines DO, based on my observations and the provider's statements to me.  I, Darek Gaines DO, attest that Waylon Madrigal is acting in a scribe capacity, has observed my performance of the services and has documented them in accordance with my direction.    Darek Gaines DO  Emergency Medicine  [facility] EMERGENCY DEPARTMENT     Darek Gaines DO  05/29/22 7350

## 2023-01-23 ENCOUNTER — NURSE TRIAGE (OUTPATIENT)
Dept: NURSING | Facility: CLINIC | Age: 43
End: 2023-01-23
Payer: COMMERCIAL

## 2023-01-24 NOTE — TELEPHONE ENCOUNTER
Nurse Triage    Is this a 2nd Level Triage? NO    Situation: Patient calling with concerns for vomiting and diarrhea.  Consent: not needed    Background: Patient states that she has been having vomiting and diarrhea since earlier today. She's been trying to keep hydrated, drinking small amounts of fluids, but her mouth is dry. The patient's son was sick with vomiting just a couple of days ago, but he was feeling well again after 24 hours. Pt denies having any high-risk health history.       Assessment: Patient's temp is 100.1 temporal. She reports having a dry mouth, but denies severe dizziness. She has voided within the past 12 hours. She has a dull headache and body aches. Patient denies abdominal pain, but reports her stomach is churning. Patient states she has thrown up 5 - 6 times today.     Protocol Recommended Disposition:   Home Care    Recommendation: Advised patient to do home care. Home care reviewed. . Care advice given. Reviewed concerning symptoms and when to call back.     Julienne Payne RN [facility] 1/23/2023 10:29 PM    Reason for Disposition    [1] SEVERE vomiting (e.g., 6 or more times/day, vomits everything) BUT [2] hydrated    Additional Information    Negative: Shock suspected (e.g., cold/pale/clammy skin, too weak to stand, low BP, rapid pulse)    Negative: Difficult to awaken or acting confused (e.g., disoriented, slurred speech)    Negative: Sounds like a life-threatening emergency to the triager    Negative: Vomiting occurs only while coughing    Negative: [1] Pregnant < 20 Weeks AND [2] nausea/vomiting began in early pregnancy (i.e., 4-8 weeks pregnant)    Negative: Chest pain    Negative: Headache is main symptom    Negative: Vomiting (or Nausea) in a cancer patient who is currently (or recently) receiving chemotherapy or radiation therapy, or cancer patient who has metastatic or end-stage cancer and is receiving palliative care    Negative: [1] Vomiting AND [2] contains red  "blood or black (\"coffee ground\") material  (Exception: few red streaks in vomit that only happened once)    Negative: Severe pain in one eye    Negative: Recent head injury (within last 3 days)    Negative: Recent abdominal injury (within last 3 days)    Negative: [1] Insulin-dependent diabetes (Type I) AND [2] glucose > 400 mg/dl (22 mmol/l)    Negative: [1] Vomiting AND [2] hernia is more painful or swollen than usual    Negative: [1] SEVERE vomiting (e.g., 6 or more times/day) AND [2] present > 8 hours (Exception: patient sounds well, is drinking liquids, does not sound dehydrated, and vomiting has lasted less than 24 hours)    Negative: [1] MODERATE vomiting (e.g., 3 - 5 times/day) AND [2] age > 60 years    Negative: Severe headache (e.g., excruciating) (Exception: similar to previous migraines)    Negative: High-risk adult (e.g., diabetes mellitus, brain tumor, V-P shunt, hernia)    Negative: [1] Drinking very little AND [2] dehydration suspected (e.g., no urine > 12 hours, very dry mouth, very lightheaded)    Negative: Patient sounds very sick or weak to the triager    Negative: [1] Vomiting AND [2] abdomen looks much more swollen than usual    Negative: [1] Vomiting AND [2] contains bile (green color)    Negative: [1] Constant abdominal pain AND [2] present > 2 hours    Negative: [1] Fever > 103 F (39.4 C) AND [2] not able to get the fever down using Fever Care Advice    Negative: [1] Fever > 101 F (38.3 C) AND [2] age > 60 years    Negative: [1] Fever > 100.0 F (37.8 C) AND [2] bedridden (e.g., nursing home patient, CVA, chronic illness, recovering from surgery)    Negative: [1] Fever > 100.0 F (37.8 C) AND [2] weak immune system (e.g., HIV positive, cancer chemo, splenectomy, organ transplant, chronic steroids)    Negative: Taking any of the following medications: digoxin (Lanoxin), lithium, theophylline, phenytoin (Dilantin)    Negative: [1] MILD or MODERATE vomiting AND [2] present > 48 hours (2 days) " (Exception: mild vomiting with associated diarrhea)    Negative: Fever present > 3 days (72 hours)    Negative: Vomiting a prescription medication    Negative: [1] MILD vomiting with diarrhea AND [2] present > 5 days    Negative: Substance use (drug use) or unhealthy alcohol use, known or suspected    Negative: Vomiting is a chronic symptom (recurrent or ongoing AND present > 4 weeks)    Protocols used: VOMITING-A-AH

## 2023-02-22 LAB — TSH SERPL-ACNC: 1.85 UIU/ML (ref 0.45–4.5)

## 2023-03-16 ENCOUNTER — LAB REQUISITION (OUTPATIENT)
Dept: LAB | Facility: CLINIC | Age: 43
End: 2023-03-16

## 2023-03-16 DIAGNOSIS — J02.9 ACUTE PHARYNGITIS, UNSPECIFIED: ICD-10-CM

## 2023-03-16 PROCEDURE — 87081 CULTURE SCREEN ONLY: CPT | Performed by: FAMILY MEDICINE

## 2023-03-19 LAB — BACTERIA SPEC CULT: NORMAL

## 2023-04-24 ENCOUNTER — MEDICAL CORRESPONDENCE (OUTPATIENT)
Dept: HEALTH INFORMATION MANAGEMENT | Facility: CLINIC | Age: 43
End: 2023-04-24
Payer: COMMERCIAL

## 2023-04-26 ENCOUNTER — TRANSCRIBE ORDERS (OUTPATIENT)
Dept: OTHER | Age: 43
End: 2023-04-26

## 2023-04-26 DIAGNOSIS — E22.1 HYPERPROLACTINEMIA (H): Primary | ICD-10-CM

## 2023-06-09 NOTE — CONFIDENTIAL NOTE
RECORDS RECEIVED FROM: internal /ce   DATE RECEIVED: 8.23.23    NOTES (FOR ALL VISITS) STATUS DETAILS   OFFICE NOTES from referring provider internal  Ying Luu MD, [facility]      MEDICATION LIST internal     IMAGING        MRI (BRAIN) internal  [place]- 3.14.22      LABS     DIABETES: HBGA1C, CREATININE, FASTING LIPIDS, MICROALBUMIN URINE, POTASSIUM, TSH, T4    THYROID: TSH, T4, CBC, THYRODLONULIN, TOTAL T3, FREE T4, CALCITONIN, CEA internal /ce Cortisol [place]- 4.22.22  Prolactin- 4.15.22  TSH/T4- 4.15.22  FSH- 4.15.22  Bmp- 4.15.22  Received labs- 2.22.22

## 2023-08-18 ENCOUNTER — HOSPITAL ENCOUNTER (EMERGENCY)
Facility: HOSPITAL | Age: 43
Discharge: HOME | End: 2023-08-18
Attending: EMERGENCY MEDICINE | Admitting: EMERGENCY MEDICINE
Payer: COMMERCIAL

## 2023-08-18 VITALS
TEMPERATURE: 98.3 F | HEIGHT: 68 IN | DIASTOLIC BLOOD PRESSURE: 102 MMHG | WEIGHT: 285 LBS | SYSTOLIC BLOOD PRESSURE: 184 MMHG | BODY MASS INDEX: 43.19 KG/M2 | HEART RATE: 97 BPM | RESPIRATION RATE: 18 BRPM | OXYGEN SATURATION: 97 %

## 2023-08-18 DIAGNOSIS — L03.116 CELLULITIS OF LEFT LOWER EXTREMITY: ICD-10-CM

## 2023-08-18 PROCEDURE — 250N000009 HC RX 250: Performed by: EMERGENCY MEDICINE

## 2023-08-18 PROCEDURE — 99283 EMERGENCY DEPT VISIT LOW MDM: CPT

## 2023-08-18 PROCEDURE — 250N000013 HC RX MED GY IP 250 OP 250 PS 637: Performed by: EMERGENCY MEDICINE

## 2023-08-18 RX ORDER — CEPHALEXIN 500 MG/1
500 CAPSULE ORAL ONCE
Status: COMPLETED | OUTPATIENT
Start: 2023-08-18 | End: 2023-08-18

## 2023-08-18 RX ORDER — CEPHALEXIN 500 MG/1
500 CAPSULE ORAL 4 TIMES DAILY
Qty: 28 CAPSULE | Refills: 0 | Status: SHIPPED | OUTPATIENT
Start: 2023-08-18 | End: 2023-08-25

## 2023-08-18 RX ORDER — LIDOCAINE HYDROCHLORIDE 20 MG/ML
10 JELLY TOPICAL ONCE
Status: COMPLETED | OUTPATIENT
Start: 2023-08-18 | End: 2023-08-18

## 2023-08-18 RX ADMIN — CEPHALEXIN 500 MG: 500 CAPSULE ORAL at 01:06

## 2023-08-18 RX ADMIN — LIDOCAINE HYDROCHLORIDE 10 ML: 20 JELLY TOPICAL at 01:07

## 2023-08-18 ASSESSMENT — ENCOUNTER SYMPTOMS: WOUND: 1

## 2023-08-18 ASSESSMENT — ACTIVITIES OF DAILY LIVING (ADL): ADLS_ACUITY_SCORE: 33

## 2023-08-18 NOTE — DISCHARGE INSTRUCTIONS
As discussed in the ER, recommend daily dressing changes and keep the wound covered.  If the blister opens apply triple antibiotic ointment to dressing and gently apply that keeping covered with antibiotic cream in place.  Monitor the area or [place] of redness and if spreading return to the ER for reexamination or follow-up with your primary doctor as antibiotics may need to be changed.

## 2023-08-18 NOTE — ED TRIAGE NOTES
"Pt arrives ambulatory to triage w/ her child endorsing that at about 2300 tonight she got bit by something. Left medial shin observed to be circular red and blistering bubble at the center. Wound itches per pt. Dull pain 3/10 and pain shoots when standing. Pt endorsing some anxiety with situation and also notes cough since the 6th mentions questioning of \"I wonder is my throat swelling up\" endorsing no notable changes in her cough and denies SOB.        "

## 2023-08-18 NOTE — ED PROVIDER NOTES
NAME: Heather Ramos  AGE: 43 year old female  YOB: 1980  MRN: [id number]  EVALUATION DATE & TIME: 2023 12:41 AM    PCP: Lio Gregorio    ED PROVIDER: Justin Malave M.D.      Chief Complaint   Patient presents with    Insect Bite         FINAL IMPRESSION:  1. Cellulitis of left lower extremity        MEDICAL DECISION MAKIN:41 AM I met with the patient, obtained history, performed an initial exam, and discussed options and plan for diagnostics and treatment here in the ED.   Patient was clinically assessed and consented to treatment. After assessment, medical decision making and workup were discussed with the patient. The patient was agreeable to plan for testing, workup, and treatment.  Pertinent Labs & Imaging studies reviewed. (See chart for details)       Medical Decision Making    History:  Supplemental history from: Documented in chart, if applicable  External Record(s) reviewed: Documented in chart, if applicable.    Work Up:  Chart documentation includes differential considered and any EKGs or imaging independently interpreted by provider, where specified.  In additional to work up documented, I considered the following work up: Documented in chart, if applicable.    External consultation:  Discussion of management with another provider: Documented in chart, if applicable    Complicating factors:  Care impacted by chronic illness: N/A  Care affected by social determinants of health: Access to Medical Care    Disposition considerations: Discharge. I prescribed additional prescription strength medication(s) as charted. See documentation for any additional details.    Heather Ramos is a 43 year old female who presents with insect bite.   Differential diagnosis includes but not limited to insect bite, local inflammatory reaction, cellulitis, abscess.  Patient with insect bite to the left shin and small blister there with findings of erythema surrounding likely cellulitis.   It is not circumferential, there is no signs of central clearing or signs of likely tick bite.  There is no stinger or remnant of the insect there.  Patient comfortable and given findings we will plan for treat with antibiotics.  Patient does have some hyperesthesia to the area of redness which would fit with both cellulitis and possibly some nerve reaction to possibly spider bite.    0 minutes of critical care time    MEDICATIONS GIVEN IN THE EMERGENCY:  Medications   cephALEXin (KEFLEX) capsule 500 mg (500 mg Oral $Given 8/18/23 0106)   lidocaine (XYLOCAINE) 2 % external gel 10 mL (10 mLs Topical $Given 8/18/23 0107)       NEW PRESCRIPTIONS STARTED AT TODAY'S ER VISIT:  Discharge Medication List as of 8/18/2023  1:14 AM        START taking these medications    Details   cephALEXin (KEFLEX) 500 MG capsule Take 1 capsule (500 mg) by mouth 4 times daily for 7 days, Disp-28 capsule, R-0, Local Print                =================================================================    HPI    Patient information was obtained from: The patient    Use of : N/A       Heather Ramos is a 43 year old female with a past medical history of hypertension, who presents to the ED via walk-in for an evaluation of insect bite.    The patient suffered an insect bite to the left lower extremity around 11 PM. She has been experiencing pain from the area since. She has a bubble pocket formed with surrounding redness at the bite site[place] She has a medical history of hypertension. She does not have diabetes or any other medical history. She is allergic to amoxicillin and penicillin, but she can tolerate cephalexin.     Otherwise, the patient denied having any other medical complaints or concerns at this time.      REVIEW OF SYSTEMS   Review of Systems   Skin:  Positive for wound (insect bite with pain to the left lower extremity).   All other systems reviewed and are negative.       PAST  "MEDICAL HISTORY:  Past Medical History:   Diagnosis Date    Abnormal Pap smear of cervix 2009    Hypertension        PAST SURGICAL HISTORY:  Past Surgical History:   Procedure Laterality Date    CYST REMOVAL Right     Wrist    TONSILLECTOMY      WISDOM TOOTH EXTRACTION         CURRENT MEDICATIONS:    No current facility-administered medications for this encounter.    Current Outpatient Medications:     cephALEXin (KEFLEX) 500 MG capsule, Take 1 capsule (500 mg) by mouth 4 times daily for 7 days, Disp: 28 capsule, Rfl: 0    losartan-hydrochlorothiazide (HYZAAR) 50-12.5 mg per tablet, [LOSARTAN-HYDROCHLOROTHIAZIDE (HYZAAR) 50-12.5 MG PER TABLET] TK 1 T PO QD, Disp: , Rfl:     ALLERGIES:  Allergies   Allergen Reactions    Amoxicillin Unknown    Penicillins Unknown       FAMILY HISTORY:  No family history on file.    SOCIAL HISTORY:   Social History     Socioeconomic History    Marital status: Single   Tobacco Use    Smoking status: Never    Smokeless tobacco: Never   Substance and Sexual Activity    Alcohol use: No    Drug use: No       PHYSICAL EXAM:    Vitals: BP (!) 184/102   Pulse 97   Temp 98.3  F (36.8  C) (Oral)   Resp 18   Ht 1.727 m (5' 8\")   Wt 129.3 kg (285 lb)   SpO2 97%   BMI 43.33 kg/m     Physical Exam  Vitals and nursing note reviewed.   Constitutional:       General: She is not in acute distress.     Appearance: Normal appearance. She is normal weight.   Cardiovascular:      Pulses: Normal pulses.   Pulmonary:      Effort: No respiratory distress.   Musculoskeletal:         General: Tenderness present.        Legs:    Skin:     General: Skin is warm and dry.      Findings: Erythema present.   Neurological:      Mental Status: She is alert.      Sensory: No sensory deficit.   Psychiatric:         Behavior: Behavior normal.           LAB:  All pertinent labs reviewed and interpreted.  Labs Ordered and Resulted from Time of ED Arrival to Time of ED Departure - No data to display    RADIOLOGY:  No " orders to display     PROCEDURES:   Procedures       I, Morales Hayes, am serving as a scribe to document services personally performed by Dr. Justin Malave  based on my observation and the provider's statements to me. I, Justin Malave MD attest that Morales Hayes is acting in a scribe capacity, has observed my performance of the services and has documented them in accordance with my direction.      Justin Malave M.D.  Emergency Medicine  [facility] Emergency Department       Justin Malave MD  08/18/23 0739

## 2023-08-18 NOTE — ED NOTES
Pts blood pressure is elevated. She reports she has not taken her blood pressure for the past week. She is taking her home blood pressure medication at time of discharge.

## 2023-08-21 ENCOUNTER — LAB REQUISITION (OUTPATIENT)
Dept: LAB | Facility: CLINIC | Age: 43
End: 2023-08-21

## 2023-08-21 DIAGNOSIS — R05.1 ACUTE COUGH: ICD-10-CM

## 2023-08-21 PROCEDURE — 87635 SARS-COV-2 COVID-19 AMP PRB: CPT | Performed by: FAMILY MEDICINE

## 2023-08-23 ENCOUNTER — OFFICE VISIT (OUTPATIENT)
Dept: ENDOCRINOLOGY | Facility: CLINIC | Age: 43
End: 2023-08-23
Attending: OBSTETRICS & GYNECOLOGY
Payer: COMMERCIAL

## 2023-08-23 ENCOUNTER — LAB (OUTPATIENT)
Dept: LAB | Facility: CLINIC | Age: 43
End: 2023-08-23
Payer: COMMERCIAL

## 2023-08-23 ENCOUNTER — PRE VISIT (OUTPATIENT)
Dept: ENDOCRINOLOGY | Facility: CLINIC | Age: 43
End: 2023-08-23

## 2023-08-23 VITALS
HEIGHT: 68 IN | HEART RATE: 87 BPM | WEIGHT: 283 LBS | BODY MASS INDEX: 42.89 KG/M2 | OXYGEN SATURATION: 97 % | SYSTOLIC BLOOD PRESSURE: 150 MMHG | DIASTOLIC BLOOD PRESSURE: 107 MMHG

## 2023-08-23 DIAGNOSIS — D35.2 PROLACTINOMA (H): Primary | ICD-10-CM

## 2023-08-23 DIAGNOSIS — D35.2 PROLACTINOMA (H): ICD-10-CM

## 2023-08-23 LAB
CORTIS SERPL-MCNC: 6.1 UG/DL
PROLACTIN SERPL 3RD IS-MCNC: 53 NG/ML (ref 5–23)
SARS-COV-2 RNA RESP QL NAA+PROBE: NEGATIVE
T4 FREE SERPL-MCNC: 1.17 NG/DL (ref 0.9–1.7)
TSH SERPL DL<=0.005 MIU/L-ACNC: 1.82 UIU/ML (ref 0.3–4.2)

## 2023-08-23 PROCEDURE — 84439 ASSAY OF FREE THYROXINE: CPT | Performed by: PATHOLOGY

## 2023-08-23 PROCEDURE — 99000 SPECIMEN HANDLING OFFICE-LAB: CPT | Performed by: PATHOLOGY

## 2023-08-23 PROCEDURE — 82024 ASSAY OF ACTH: CPT | Performed by: INTERNAL MEDICINE

## 2023-08-23 PROCEDURE — 84443 ASSAY THYROID STIM HORMONE: CPT | Performed by: PATHOLOGY

## 2023-08-23 PROCEDURE — 84146 ASSAY OF PROLACTIN: CPT | Performed by: INTERNAL MEDICINE

## 2023-08-23 PROCEDURE — 99204 OFFICE O/P NEW MOD 45 MIN: CPT | Performed by: INTERNAL MEDICINE

## 2023-08-23 PROCEDURE — 82533 TOTAL CORTISOL: CPT | Performed by: INTERNAL MEDICINE

## 2023-08-23 PROCEDURE — 84305 ASSAY OF SOMATOMEDIN: CPT | Performed by: INTERNAL MEDICINE

## 2023-08-23 PROCEDURE — 36415 COLL VENOUS BLD VENIPUNCTURE: CPT | Performed by: PATHOLOGY

## 2023-08-23 RX ORDER — CABERGOLINE 0.5 MG/1
0.25 TABLET ORAL
Qty: 6 TABLET | Refills: 3 | Status: SHIPPED | OUTPATIENT
Start: 2023-08-24

## 2023-08-23 RX ORDER — CABERGOLINE 0.5 MG/1
0.25 TABLET ORAL
Qty: 6 TABLET | Refills: 3 | Status: SHIPPED | OUTPATIENT
Start: 2023-08-24 | End: 2023-08-23

## 2023-08-23 ASSESSMENT — PAIN SCALES - GENERAL: PAINLEVEL: NO PAIN (0)

## 2023-08-23 NOTE — PROGRESS NOTES
- Endocrinology Initial Consultation -    Reason for visit/consult:  Data Unavailable    Primary care provider: Lio Gregorio    HPI: A 44 yo female here for evaluation of elevated prolactin level.  Patient had COVID vaccination in April 2021 and her menstrual cycle started to become irregular and she started having spotting and second COVID-vaccine was done in May 2021 and since then she developed amenorrhea.  She went to OB/GYN and prolactin was measured in April 2022 which was mildly elevated to 44.  She underwent a brain MRI which shows micro hypoenhancement lesion on the left part of the sella suspicious for adenoma.   She was seen by endocrinologist in November 2022 where she was started on cabergoline 0.25 mg twice a week, patient tried however she started to have a massive blood clot and continuous bleeding and she stopped taking after 2 months.  Currently she is still a menorrhea.  She also mentioned difficulty to lose weight.  Other medical condition essential hypertension which has been controlled with medications.  She had one childbirth 6 years ago with natural pregnancy.     Past Medical/Surgical History:  Past Medical History:   Diagnosis Date    Abnormal Pap smear of cervix 2009    Hypertension      Past Surgical History:   Procedure Laterality Date    CYST REMOVAL Right     Wrist    TONSILLECTOMY      WISDOM TOOTH EXTRACTION         Allergies:  Allergies   Allergen Reactions    Amoxicillin Unknown    Penicillins Unknown       Current Medications   Current Outpatient Medications   Medication    cephALEXin (KEFLEX) 500 MG capsule    losartan-hydrochlorothiazide (HYZAAR) 50-12.5 mg per tablet     No current facility-administered medications for this visit.       Family History:  No family history on file.    Social History:  Social History     Tobacco Use    Smoking status: Never    Smokeless tobacco: Never   Substance Use  Topics    Alcohol use: No       ROS:  Full review of systems taken with the help of the intake sheet. Otherwise a complete 14 point review of systems was taken and is negative unless stated in the history above.      Physical Exam:   Vitals: There were no vitals taken for this visit.  BMI= There is no height or weight on file to calculate BMI.   General: well appearing, no acute distress, pleasant and conversant,   Mental Status/neuro: alert and oriented  Face: symmetrical, normal facial color  Eyes: anicteric, no proptosis or lid lag  Resp: normally breathing    Labs : I reviewed data from epic and extract and summarize the pertinent data here.   Lab Results   Component Value Date     09/10/2021      Lab Results   Component Value Date    POTASSIUM 4.4 09/10/2021     Lab Results   Component Value Date    CHLORIDE 106 09/10/2021     Lab Results   Component Value Date    LAM 9.1 09/10/2021     Lab Results   Component Value Date    CO2 28 09/10/2021     Lab Results   Component Value Date    BUN 13 09/10/2021     Lab Results   Component Value Date    CR 0.79 09/10/2021     Lab Results   Component Value Date    GLC 94 09/10/2021     Lab Results   Component Value Date    TSH 1.57 01/30/2020     No results found for: T4  No results found for: A1C    No results found for: IGF1  No results found for: LH  No results found for: FSH  No results found for: ESTROGEN  No results found for: PROLACTIN        MRI Brain: I personally reviewed the original images and agree with the below reports.               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        Inez Patrick MD  Staff Physician  Endocrinology and Metabolism  License: [id number]

## 2023-08-23 NOTE — LETTER
8/23/2023       RE: Heather Ramos  [address]  [place] [address]     Dear Colleague,    Thank you for referring your patient, Heather Ramos, to the [facility] [place] at [facility]. Please see a copy of my visit note below.                                                                               - Endocrinology Initial Consultation -    Reason for visit/consult:  Data Unavailable    Primary care provider: Lio Gregorio    HPI: A 42 yo female here for evaluation of elevated prolactin level.  Patient had COVID vaccination in April 2021 and her menstrual cycle started to become irregular and she started having spotting and second COVID-vaccine was done in May 2021 and since then she developed amenorrhea.  She went to OB/GYN and prolactin was measured in April 2022 which was mildly elevated to 44.  She underwent a brain MRI which shows micro hypoenhancement lesion on the left part of the sella suspicious for adenoma.   She was seen by endocrinologist in November 2022 where she was started on cabergoline 0.25 mg twice a week, patient tried however she started to have a massive blood clot and continuous bleeding and she stopped taking after 2 months.  Currently she is still a menorrhea.  She also mentioned difficulty to lose weight.  Other medical condition essential hypertension which has been controlled with medications.  She had one childbirth 6 years ago with natural pregnancy.     Past Medical/Surgical History:  Past Medical History:   Diagnosis Date    Abnormal Pap smear of cervix 2009    Hypertension      Past Surgical History:   Procedure Laterality Date    CYST REMOVAL Right     Wrist    TONSILLECTOMY      WISDOM TOOTH EXTRACTION         Allergies:  Allergies   Allergen Reactions    Amoxicillin Unknown    Penicillins Unknown       Current Medications   Current Outpatient Medications   Medication    cephALEXin  (KEFLEX) 500 MG capsule    losartan-hydrochlorothiazide (HYZAAR) 50-12.5 mg per tablet     No current facility-administered medications for this visit.       Family History:  No family history on file.    Social History:  Social History     Tobacco Use    Smoking status: Never    Smokeless tobacco: Never   Substance Use Topics    Alcohol use: No       ROS:  Full review of systems taken with the help of the intake sheet. Otherwise a complete 14 point review of systems was taken and is negative unless stated in the history above.      Physical Exam:   Vitals: There were no vitals taken for this visit.  BMI= There is no height or weight on file to calculate BMI.   General: well appearing, no acute distress, pleasant and conversant,   Mental Status/neuro: alert and oriented  Face: symmetrical, normal facial color  Eyes: anicteric, no proptosis or lid lag  Resp: normally breathing    Labs : I reviewed data from epic and extract and summarize the pertinent data here.   Lab Results   Component Value Date     09/10/2021      Lab Results   Component Value Date    POTASSIUM 4.4 09/10/2021     Lab Results   Component Value Date    CHLORIDE 106 09/10/2021     Lab Results   Component Value Date    LAM 9.1 09/10/2021     Lab Results   Component Value Date    CO2 28 09/10/2021     Lab Results   Component Value Date    BUN 13 09/10/2021     Lab Results   Component Value Date    CR 0.79 09/10/2021     Lab Results   Component Value Date    GLC 94 09/10/2021     Lab Results   Component Value Date    TSH 1.57 01/30/2020     No results found for: T4  No results found for: A1C    No results found for: IGF1  No results found for: LH  No results found for: FSH  No results found for: ESTROGEN  No results found for: PROLACTIN        MRI Brain: I personally reviewed the original images and agree with the below reports.               Assessment and Plan  43 year old female with     Elevated PRL and pituitary lesion,     Likely micro  prolactinoma    Previsously not tolerated with cabergoline     More blood clotting issues at that time, we will resume again cabergoline with smaller dose (0.25 mg weekly)    Repeat MRI in 6 month       45 minutes spent on the date of the encounter doing chart review, history and exam, documentation and further activities as noted above.        Inez Patrick MD  Staff Physician  Endocrinology and Metabolism  License: [id number]

## 2023-08-24 LAB — ACTH PLAS-MCNC: 28 PG/ML

## 2023-08-25 LAB — IGF-I BLD-MCNC: 131 NG/ML (ref 75–267)

## 2023-09-29 ENCOUNTER — NURSE TRIAGE (OUTPATIENT)
Dept: NURSING | Facility: CLINIC | Age: 43
End: 2023-09-29

## 2023-09-29 NOTE — TELEPHONE ENCOUNTER
No identification on outgoing message. M to check MyChart with review and recommendation.   Katia Holley, RN on 10/2/2023 at 8:01 AM             RE    Message  Received: 2 days ago  Inez Patrick MD Miller, Deanne M RN  Caller: Unspecified (3 days ago,  2:42 PM)  I do not think so, not aware. At least not the major side effect.     Inez           Previous Messages    Disposition    See More Appropriate Protocol   See in Office Today or Tomorrow       What would patient/caregiver have done without intervention? Call Clinic/PCP   Patient/Caregiver understands and will follow disposition? No, wishes to speak with PCP     Contacts     Type Contact Phone/Fax   09/29/2023 02:42 PM CDT Phone (Incoming) Richard Heather MELODY (Self) [phone] (M)     Rectal Problem  (Newest Message First)  View All Conversations on this Encounter   Inez Patrick MD  You 2 days ago     TA  I do not think so, not aware. At least not the major side effect.     Inez       You  Inez Patrick MD 3 days ago     DM  Would dostinex cause rectal bleeding?        Whitley Queen, ELLEN  p [facility] 3 days ago     AT  Situation:  Rectal bleeding     Background/Assessment:     Pt started taking cabergoline (DOSTINEX) 0.5 MG tablet.     Pt had a menstrual cycle.  But it is done now.     But now Pt is noticing some blood coming from the rectum.     Some in the toilet and some in her bowel movement.         No problems with hemorrhoids.   No nausea vomiting, constipation or diarrhea noted.     But not sure why she is bleeding from the rectum.     Pt wondering if it is because of the new medication or if there is something else going on.      Nurse Triage SBAR    Is this a 2nd Level Triage?  Yes     Situation:  Rectal bleeding     Background/Assessment:     Pt started taking cabergoline (DOSTINEX) 0.5 MG tablet.    Pt had a menstrual cycle.  But it is done now.    But now Pt is noticing some blood coming from the rectum.    Some in  the toilet and some in her bowel movement.         No problems with hemorrhoids.   No nausea vomiting, constipation or diarrhea noted.    But not sure why she is bleeding from the rectum.    Pt wondering if it is because of the new medication or if there is something else going on.    Pt would like a call back from the clinic.    Please call Pt back @ [phone] for further assistance.    Thank you    Whitley Queen RN  Central Triage Red Flags/Med Refills      Protocol Recommended Disposition:   Call back from the clinic         Reason for Disposition    Blood in or on bowel movement is main symptom    Rectal symptoms    Patient wants to be seen    Additional Information    Negative: Sounds like a life-threatening emergency to the triager    Negative: Diarrhea is main symptom    Negative: Constipation is main symptom (e.g., pain or discomfort caused by passage of hard BMs)    Negative: Passed out (i.e., fainted, collapsed and was not responding)    Negative: Shock suspected (e.g., cold/pale/clammy skin, too weak to stand, low BP, rapid pulse)    Negative: Vomiting red blood or black (coffee ground) material    Negative: Sounds like a life-threatening emergency to the triager    Negative: Diarrhea is main symptom    Negative: MPOX SUSPECTED (e.g., direct skin contact such as sex, recent travel to [place] or [place]) and any SYMPTOMS OF MPOX (e.g., rash, fever, muscle aches, or swollen lymph nodes)    Negative: At risk for Mpox (men-who-have-sex-with-men) and possible exposure (e.g., multiple sex partners in past 21 days) and ANY SYMPTOMS OF MPOX (e.g., rash, fever, muscle aches, or swollen lymph nodes)    Negative: Sexual assault or rape (sexual intercourse or activity occurs without freely given consent), known or suspected    Negative: Injury to rectum    Negative: Patient sounds very sick or weak to the triager    Negative: Severe rectal pain    Negative: Rectal pain or redness and fever > 100.4 F (38.0 C)     Negative: Acute onset rectal pain and constipation (straining with rectal pressure or fullness), which is not relieved by Sitz bath or suppository    Negative: MODERATE-SEVERE rectal pain (i.e., interferes with school, work, or sleep)    Negative: MODERATE-SEVERE rectal itching (i.e., interferes with school, work, or sleep)    Negative: Last bowel movement (BM) > 4 days ago    Negative: Rectal area looks infected (e.g., draining sore, spreading redness)    Negative: Rash of rectal area (e.g., open sore, painful tiny water blisters, unexplained bumps)    Negative: Patient is worried they have a sexually transmitted infection (STI)    Negative: Home treatment > 3 days for rectal pain and not improved    Negative: Home treatment for > 3 days for rectal itching and not improved    Protocols used: Rectal Symptoms-A-OH, Rectal Bleeding-A-OH

## 2023-10-15 ENCOUNTER — HEALTH MAINTENANCE LETTER (OUTPATIENT)
Age: 43
End: 2023-10-15

## 2023-11-19 ENCOUNTER — OFFICE VISIT (OUTPATIENT)
Dept: FAMILY MEDICINE | Facility: CLINIC | Age: 43
End: 2023-11-19
Payer: COMMERCIAL

## 2023-11-19 VITALS
BODY MASS INDEX: 43.2 KG/M2 | RESPIRATION RATE: 18 BRPM | SYSTOLIC BLOOD PRESSURE: 176 MMHG | HEART RATE: 95 BPM | DIASTOLIC BLOOD PRESSURE: 96 MMHG | OXYGEN SATURATION: 100 % | TEMPERATURE: 98.4 F | WEIGHT: 284.1 LBS

## 2023-11-19 DIAGNOSIS — J06.9 UPPER RESPIRATORY TRACT INFECTION, UNSPECIFIED TYPE: Primary | ICD-10-CM

## 2023-11-19 LAB
FLUAV AG SPEC QL IA: NEGATIVE
FLUBV AG SPEC QL IA: NEGATIVE

## 2023-11-19 PROCEDURE — 87804 INFLUENZA ASSAY W/OPTIC: CPT | Performed by: PHYSICIAN ASSISTANT

## 2023-11-19 PROCEDURE — 87635 SARS-COV-2 COVID-19 AMP PRB: CPT | Performed by: PHYSICIAN ASSISTANT

## 2023-11-19 PROCEDURE — 99213 OFFICE O/P EST LOW 20 MIN: CPT | Performed by: PHYSICIAN ASSISTANT

## 2023-11-19 RX ORDER — BENZONATATE 200 MG/1
200 CAPSULE ORAL 3 TIMES DAILY PRN
Qty: 21 CAPSULE | Refills: 0 | Status: SHIPPED | OUTPATIENT
Start: 2023-11-19 | End: 2024-04-05

## 2023-11-19 RX ORDER — BENZONATATE 200 MG/1
200 CAPSULE ORAL 3 TIMES DAILY PRN
Qty: 21 CAPSULE | Refills: 0 | Status: SHIPPED | OUTPATIENT
Start: 2023-11-19 | End: 2023-11-19

## 2023-11-19 NOTE — PROGRESS NOTES
Upper respiratory tract infection, unspecified type  - Influenza A & B Antigen - Clinic Collect  - Symptomatic COVID-19 Virus (Coronavirus) by PCR Nose  - benzonatate (TESSALON) 200 MG capsule; Take 1 capsule (200 mg) by mouth 3 times daily as needed for cough    Age 12 months or more  Okay to use Zarbee's   Okay to use Rx Children Tylenol if prescribed (Dose based on weight)    Age 2-12:   Okay to use Children Motrin or Tylenol over the counter.    Adults:  Okay to take acetaminophen 500 mg- 2 tabs (Total of 1000 mg) every 8 hrs   Okay to take ibuprofen 200 mg- 3 tabs (Total of 600 mg) every 6 hours        Okay to use Neti pot for sinus lavage up to three times daily for congestion and sinus pressure if present. Daily hot shower can be beneficial for congestion and body aches. Okay to use bedroom vaporizer or humidifier if symptoms are worse at night. Nightly Vicks Vapor rub and 5-10 mg of Melatonin okay to use for sleep.     Over the counter cough medication and decongestants okay if not prescribed by me during this visit. For homeopathic alternatives to cough syrup and decongestant, feel free to try Elderberry extract.    Okay to use salt water gargles, warm tea (or warm water with lemon and honey), and lozenges for any throat discomfort. Chloraseptic spray is also highly encourages for throat pain/irritation.     Patient will need to get plenty of rest and drink at least 1.5-2 liters of fluids daily for adults and 1-1.5 liters for children. If vomiting and not tolerating liquids for more than 24 hrs, please go to your nearest emergency department for IV fluids and further treatment.     Patient is not contagious after 1 week from start of symptoms. If possible, wear mask for first 7 days. Wash hands regularly and vigorously for 30 seconds often.     Patient was advised to return to clinic for reevaluation (either UC or PCP) if symptoms do not improve in 7 days. Patient educated on red flag symptoms and asked  to go directly to the ED if these symptoms present themselves.     [facility]    Subjective   43 year old who presents to clinic today for the following health issues:    Sick       HPI     Acute Illness  Acute illness concerns: Bodyaches, coughing, no fever, coughing up mucus, symptoms started since Friday, headaches, no dizziness, fatigue . Began to feel sick on Friday.    Symptoms:  Fever: No  Chills/Sweats: chills   Headache (location?): No  Sinus Pressure: Yes  Conjunctivitis:  No  Ear Pain: no  Rhinorrhea: YES  Congestion: YES  Sore Throat: No  Cough: YES  Wheeze: No  Decreased Appetite: No  Nausea: No  Vomiting: No  Diarrhea: No  Dysuria/Freq.: No  Dysuria or Hematuria: No  Fatigue/Achiness: YES  Sick/Strep Exposure: YES- family is sick   Therapies tried and outcome: Nyquil and dayquil     Review of Systems   Review of Systems   See HPI    Objective    Temp: 98.4  F (36.9  C) Temp src: Oral BP: (!) 176/96 Pulse: 95   Resp: 18 SpO2: 100 %       Physical Exam   Physical Exam  Constitutional:       General: She is not in acute distress.     Appearance: Normal appearance. She is normal weight. She is not ill-appearing, toxic-appearing or diaphoretic.   HENT:      Head: Normocephalic and atraumatic.      Right Ear: Tympanic membrane, ear canal and external ear normal. There is no impacted cerumen.      Left Ear: Tympanic membrane, ear canal and external ear normal. There is no impacted cerumen.      Nose: Congestion and rhinorrhea present.      Mouth/Throat:      Mouth: Mucous membranes are moist.      Pharynx: No oropharyngeal exudate or posterior oropharyngeal erythema.   Cardiovascular:      Rate and Rhythm: Normal rate and regular rhythm.      Pulses: Normal pulses.      Heart sounds: Normal heart sounds. No murmur heard.     No friction rub. No gallop.   Pulmonary:      Effort: Pulmonary effort is normal. No respiratory distress.      Breath sounds: No stridor. No  wheezing, rhonchi or rales.   Chest:      Chest wall: No tenderness.   Lymphadenopathy:      Cervical: No cervical adenopathy.   Neurological:      General: No focal deficit present.      Mental Status: She is alert and oriented to person, place, and time. Mental status is at baseline.      Gait: Gait normal.   Psychiatric:         Mood and Affect: Mood normal.         Behavior: Behavior normal.         Thought Content: Thought content normal.         Judgment: Judgment normal.          Results for orders placed or performed in visit on 11/19/23 (from the past 24 hour(s))   Influenza A & B Antigen - Clinic Collect    Specimen: Nasopharyngeal; Swab   Result Value Ref Range    Influenza A antigen Negative Negative    Influenza B antigen Negative Negative    Narrative    Test results must be correlated with clinical data. If necessary, results should be confirmed by a molecular assay or viral culture.

## 2023-11-20 LAB — SARS-COV-2 RNA RESP QL NAA+PROBE: NEGATIVE

## 2024-01-26 ENCOUNTER — LAB REQUISITION (OUTPATIENT)
Dept: LAB | Facility: CLINIC | Age: 44
End: 2024-01-26

## 2024-01-26 DIAGNOSIS — Z13.220 ENCOUNTER FOR SCREENING FOR LIPOID DISORDERS: ICD-10-CM

## 2024-01-26 DIAGNOSIS — I10 ESSENTIAL (PRIMARY) HYPERTENSION: ICD-10-CM

## 2024-01-26 DIAGNOSIS — J02.9 ACUTE PHARYNGITIS, UNSPECIFIED: ICD-10-CM

## 2024-01-26 PROCEDURE — 80053 COMPREHEN METABOLIC PANEL: CPT | Performed by: NURSE PRACTITIONER

## 2024-01-26 PROCEDURE — 80061 LIPID PANEL: CPT | Performed by: NURSE PRACTITIONER

## 2024-01-26 PROCEDURE — 87081 CULTURE SCREEN ONLY: CPT | Performed by: NURSE PRACTITIONER

## 2024-01-27 LAB
ALBUMIN SERPL BCG-MCNC: 3.9 G/DL (ref 3.5–5.2)
ALP SERPL-CCNC: 80 U/L (ref 40–150)
ALT SERPL W P-5'-P-CCNC: 13 U/L (ref 0–50)
ANION GAP SERPL CALCULATED.3IONS-SCNC: 9 MMOL/L (ref 7–15)
AST SERPL W P-5'-P-CCNC: 15 U/L (ref 0–45)
BILIRUB SERPL-MCNC: 0.2 MG/DL
BUN SERPL-MCNC: 8.2 MG/DL (ref 6–20)
CALCIUM SERPL-MCNC: 8.5 MG/DL (ref 8.6–10)
CHLORIDE SERPL-SCNC: 101 MMOL/L (ref 98–107)
CHOLEST SERPL-MCNC: 145 MG/DL
CREAT SERPL-MCNC: 0.76 MG/DL (ref 0.51–0.95)
DEPRECATED HCO3 PLAS-SCNC: 27 MMOL/L (ref 22–29)
EGFRCR SERPLBLD CKD-EPI 2021: >90 ML/MIN/1.73M2
FASTING STATUS PATIENT QL REPORTED: ABNORMAL
GLUCOSE SERPL-MCNC: 104 MG/DL (ref 70–99)
HDLC SERPL-MCNC: 44 MG/DL
LDLC SERPL CALC-MCNC: 88 MG/DL
NONHDLC SERPL-MCNC: 101 MG/DL
POTASSIUM SERPL-SCNC: 3.5 MMOL/L (ref 3.4–5.3)
PROT SERPL-MCNC: 6.3 G/DL (ref 6.4–8.3)
SODIUM SERPL-SCNC: 137 MMOL/L (ref 135–145)
TRIGL SERPL-MCNC: 63 MG/DL

## 2024-01-28 LAB — BACTERIA SPEC CULT: NORMAL

## 2024-02-20 ENCOUNTER — ANCILLARY PROCEDURE (OUTPATIENT)
Dept: MRI IMAGING | Facility: CLINIC | Age: 44
End: 2024-02-20
Attending: INTERNAL MEDICINE
Payer: COMMERCIAL

## 2024-02-20 DIAGNOSIS — D35.2 PROLACTINOMA (H): ICD-10-CM

## 2024-02-20 PROCEDURE — A9585 GADOBUTROL INJECTION: HCPCS | Performed by: RADIOLOGY

## 2024-02-20 PROCEDURE — 70553 MRI BRAIN STEM W/O & W/DYE: CPT | Mod: GC | Performed by: RADIOLOGY

## 2024-02-20 RX ORDER — GADOBUTROL 604.72 MG/ML
15 INJECTION INTRAVENOUS ONCE
Status: COMPLETED | OUTPATIENT
Start: 2024-02-20 | End: 2024-02-20

## 2024-02-20 RX ADMIN — GADOBUTROL 13 ML: 604.72 INJECTION INTRAVENOUS at 10:14

## 2024-02-20 NOTE — DISCHARGE INSTRUCTIONS
MRI Contrast Discharge Instructions    The IV contrast you received today will pass out of your body in your  urine. This will happen in the next 24 hours. You will not feel this process.  Your urine will not change color.    Drink at least 4 extra glasses of water or juice today (unless your doctor  has restricted your fluids). This reduces the stress on your kidneys.  You may take your regular medicines.    If you are on dialysis: It is best to have dialysis today.    If you have a reaction: Most reactions happen right away. If you have  any new symptoms after leaving the hospital (such as hives or swelling),  call your hospital at the correct number below. Or call your family doctor.  If you have breathing distress or wheezing, call 911.    Special instructions: ***    I have read and understand the above information.    Signature:______________________________________ Date:___________    Staff:__________________________________________ Date:___________     Time:__________    [facility] Departments:    ___[place]: [phone]  ___[facility]: [phone]  ___[place]: [phone] ___[facility]: [phone]  ___[facility]: [phone]  ___[facility]: [phone]  ___[phone]  ___[facility]: [phone]  ___[phone]

## 2024-02-27 NOTE — RESULT ENCOUNTER NOTE
Dear Heather     Here is your results. Radiology read no change but seems tendency of shrinking, which is good.     Please call nursing line, if you are [facility] patient at [phone], if [facility] at [phone],  if you have any questions.    Please take care  Inez Patrick MD

## 2024-03-03 ENCOUNTER — HEALTH MAINTENANCE LETTER (OUTPATIENT)
Age: 44
End: 2024-03-03

## 2024-04-05 ENCOUNTER — OFFICE VISIT (OUTPATIENT)
Dept: FAMILY MEDICINE | Facility: CLINIC | Age: 44
End: 2024-04-05
Payer: COMMERCIAL

## 2024-04-05 VITALS
DIASTOLIC BLOOD PRESSURE: 106 MMHG | OXYGEN SATURATION: 100 % | TEMPERATURE: 98.5 F | SYSTOLIC BLOOD PRESSURE: 176 MMHG | HEART RATE: 111 BPM

## 2024-04-05 DIAGNOSIS — J02.0 STREP THROAT: Primary | ICD-10-CM

## 2024-04-05 DIAGNOSIS — J02.9 ACUTE SORE THROAT: ICD-10-CM

## 2024-04-05 LAB — DEPRECATED S PYO AG THROAT QL EIA: POSITIVE

## 2024-04-05 PROCEDURE — 87880 STREP A ASSAY W/OPTIC: CPT

## 2024-04-05 PROCEDURE — 99213 OFFICE O/P EST LOW 20 MIN: CPT

## 2024-04-05 RX ORDER — AZITHROMYCIN 500 MG/1
500 TABLET, FILM COATED ORAL DAILY
Qty: 5 TABLET | Refills: 0 | Status: SHIPPED | OUTPATIENT
Start: 2024-04-05 | End: 2024-04-10

## 2024-04-05 NOTE — PROGRESS NOTES
Assessment & Plan     Acute sore throat    - Streptococcus A Rapid Screen w/Reflex to PCR    Strep throat    - azithromycin (ZITHROMAX) 500 MG tablet; Take 1 tablet (500 mg) by mouth daily for 5 days          Return in about 1 week (around 4/12/2024), or if symptoms worsen or fail to improve.    Cecy Romero is a 44 year old, presenting for the following health issues:  Urgent Care and Pharyngitis (Sx started yesterday with a sore throat. Per pt- hurts to swallow)    HPI     Presents with sore throat for the past 24 hours.  No fever but has fatigue and swollen glands.  Slight headache.  No cough or runny nose.  No known ill contacts.        Review of Systems  Constitutional, HEENT, cardiovascular, pulmonary, gi and gu systems are negative, except as otherwise noted.      Objective    BP (!) 176/106   Pulse 111   Temp 98.5  F (36.9  C) (Tympanic)   LMP  (LMP Unknown)   SpO2 100%   There is no height or weight on file to calculate BMI.  Physical Exam   GENERAL: alert and no distress  EYES: Eyes grossly normal to inspection, PERRL and conjunctivae and sclerae normal  HENT: normal cephalic/atraumatic, ear canals and TM's normal, oral mucous membranes moist, uvula elongated, and tonsillar erythema  NECK: bilateral anterior cervical adenopathy  RESP: lungs clear to auscultation - no rales, rhonchi or wheezes  CV: regular rates and rhythm, normal S1 S2, no S3 or S4, and no murmur, click or rub    Results for orders placed or performed in visit on 04/05/24   Streptococcus A Rapid Screen w/Reflex to PCR     Status: Abnormal    Specimen: Throat; Swab   Result Value Ref Range    Group A Strep antigen Positive (A) Negative             Signed Electronically by: [facility]

## 2024-04-17 ENCOUNTER — VIRTUAL VISIT (OUTPATIENT)
Dept: ENDOCRINOLOGY | Facility: CLINIC | Age: 44
End: 2024-04-17
Payer: COMMERCIAL

## 2024-04-17 DIAGNOSIS — D35.2 PROLACTINOMA (H): Primary | ICD-10-CM

## 2024-04-17 PROCEDURE — 99214 OFFICE O/P EST MOD 30 MIN: CPT | Mod: 95 | Performed by: INTERNAL MEDICINE

## 2024-04-17 NOTE — NURSING NOTE
Is the patient currently in the state of [place]? YES    Visit mode:VIDEO    If the visit is dropped, the patient can be reconnected by: VIDEO VISIT: Text to cell phone:   Telephone Information:   Mobile [phone]       Will anyone else be joining the visit? NO  (If patient encounters technical issues they should call [phone] :[phone])    How would you like to obtain your AVS? MyChart    Are changes needed to the allergy or medication list? No    Are refills needed on medications prescribed by this physician? YES    Reason for visit: RECHECK and Video Visit    Ruchi HAYES

## 2024-04-17 NOTE — LETTER
4/17/2024       RE: Heather Ramos  [address]  [place] [address]     Dear Colleague,    Thank you for referring your patient, Heather Ramos, to the [facility] [place] at [facility]. Please see a copy of my visit note below.    Virtual Visit Details  Type of service:  Video Visit   Start 9:30 am  End  9:50 am  [facility]                                                                               - Endocrinology follow up-    Reason for visit/consult:  micro prolactinoma    Primary care provider: Lio Gregorio      Assessment and Plan  A 44 year old female with     Micro prolactinoma   Previsously not tolerated with cabergoline    - currently she is more or less tolerating to cabergoline half tab but not consistent to take, MRI 2/2024 no change 6 mm hypoenhanced lesion in the left part of the sella.     - try cabergoline 0.25 mg twice a week, persistently then recheck PRL level    Menorrhalgia    More vaginal blood clotting issues at that time (previous try of cabergoline), we will resume again cabergoline with smaller dose (0.25 mg weekly)      30 minutes spent on the date of the encounter doing chart review, history and exam, documentation and further activities as noted above.        Inez Patrick MD  Staff Physician  Endocrinology and Metabolism  License: [id number]       Interval History as of 4/17/2024 : Patient has been doing ok, not much change, still having HA and menorrhalgia, but tolerating to cabergoline half tablet , she is taking inconsistently.  HPI: A 44 yo female here for evaluation of elevated prolactin level.  Patient had COVID vaccination in April 2021 and her menstrual cycle started to become irregular and she started having spotting and second COVID-vaccine was done in May 2021 and since then she developed amenorrhea.  She went to OB/GYN and prolactin was measured in April 2022 which was mildly elevated  to 44.  She underwent a brain MRI which shows micro hypoenhancement lesion on the left part of the sella suspicious for adenoma.   She was seen by endocrinologist in November 2022 where she was started on cabergoline 0.25 mg twice a week, patient tried however she started to have a massive blood clot and continuous bleeding and she stopped taking after 2 months.  Currently she is still a menorrhea.  She also mentioned difficulty to lose weight.  Other medical condition essential hypertension which has been controlled with medications.  She had one childbirth 6 years ago with natural pregnancy.     Past Medical/Surgical History:  Past Medical History:   Diagnosis Date    Abnormal Pap smear of cervix 2009    Hypertension      Past Surgical History:   Procedure Laterality Date    CYST REMOVAL Right     Wrist    TONSILLECTOMY      WISDOM TOOTH EXTRACTION         Allergies:  Allergies   Allergen Reactions    Penicillins Unknown, Anaphylaxis and Rash     Other Reaction(s): Not available, Throat Swelling/Closing    Amoxicillin Unknown       Current Medications   Current Outpatient Medications   Medication Sig Dispense Refill    cabergoline (DOSTINEX) 0.5 MG tablet Take 0.5 tablets (0.25 mg) by mouth twice a week 6 tablet 3    losartan-hydrochlorothiazide (HYZAAR) 50-12.5 mg per tablet [LOSARTAN-HYDROCHLOROTHIAZIDE (HYZAAR) 50-12.5 MG PER TABLET] TK 1 T PO QD       No current facility-administered medications for this visit.       Family History:  No family history on file.    Social History:  Social History     Tobacco Use    Smoking status: Never     Passive exposure: Never    Smokeless tobacco: Never   Substance Use Topics    Alcohol use: No       ROS:  Full review of systems taken with the help of the intake sheet. Otherwise a complete 14 point review of systems was taken and is negative unless stated in the history above.      Physical Exam:   Vitals: LMP  (LMP Unknown)   BMI= There is no height or weight on file to  calculate BMI.   General: well appearing, no acute distress, pleasant and conversant,   Mental Status/neuro: alert and oriented  Face: symmetrical, normal facial color  Eyes: anicteric, no proptosis or lid lag  Resp: normally breathing    Labs : I reviewed data from epic and extract and summarize the pertinent data here.   Lab Results   Component Value Date     09/10/2021      Lab Results   Component Value Date    POTASSIUM 4.4 09/10/2021     Lab Results   Component Value Date    CHLORIDE 106 09/10/2021     Lab Results   Component Value Date    LAM 9.1 09/10/2021     Lab Results   Component Value Date    CO2 28 09/10/2021     Lab Results   Component Value Date    BUN 13 09/10/2021     Lab Results   Component Value Date    CR 0.79 09/10/2021     Lab Results   Component Value Date    GLC 94 09/10/2021     Lab Results   Component Value Date    TSH 1.57 01/30/2020     No results found for: T4  No results found for: A1C    No results found for: IGF1  No results found for: LH  No results found for: FSH  No results found for: ESTROGEN  No results found for: PROLACTIN        MRI Brain: I personally reviewed the original images and agree with the below reports.

## 2024-04-17 NOTE — PROGRESS NOTES
Virtual Visit Details  Type of service:  Video Visit   Start 9:30 am  End  9:50 am  [facility]well                                                                               - Endocrinology follow up-    Reason for visit/consult:  micro prolactinoma    Primary care provider: Lio Gregorio      Assessment and Plan  A 44 year old female with     Micro prolactinoma   Previsously not tolerated with cabergoline    - currently she is more or less tolerating to cabergoline half tab but not consistent to take, MRI 2/2024 no change 6 mm hypoenhanced lesion in the left part of the sella.     - try cabergoline 0.25 mg twice a week, persistently then recheck PRL level    Menorrhalgia    More vaginal blood clotting issues at that time (previous try of cabergoline), we will resume again cabergoline with smaller dose (0.25 mg weekly)      30 minutes spent on the date of the encounter doing chart review, history and exam, documentation and further activities as noted above.        Inez Patrick MD  Staff Physician  Endocrinology and Metabolism  License: [id number]       Interval History as of 4/17/2024 : Patient has been doing ok, not much change, still having HA and menorrhalgia, but tolerating to cabergoline half tablet , she is taking inconsistently.  HPI: A 42 yo female here for evaluation of elevated prolactin level.  Patient had COVID vaccination in April 2021 and her menstrual cycle started to become irregular and she started having spotting and second COVID-vaccine was done in May 2021 and since then she developed amenorrhea.  She went to OB/GYN and prolactin was measured in April 2022 which was mildly elevated to 44.  She underwent a brain MRI which shows micro hypoenhancement lesion on the left part of the sella suspicious for adenoma.   She was seen by endocrinologist in November 2022 where she was started on cabergoline 0.25 mg twice a week, patient tried however she started to have a massive blood clot and continuous bleeding  and she stopped taking after 2 months.  Currently she is still a menorrhea.  She also mentioned difficulty to lose weight.  Other medical condition essential hypertension which has been controlled with medications.  She had one childbirth 6 years ago with natural pregnancy.     Past Medical/Surgical History:  Past Medical History:   Diagnosis Date    Abnormal Pap smear of cervix 2009    Hypertension      Past Surgical History:   Procedure Laterality Date    CYST REMOVAL Right     Wrist    TONSILLECTOMY      WISDOM TOOTH EXTRACTION         Allergies:  Allergies   Allergen Reactions    Penicillins Unknown, Anaphylaxis and Rash     Other Reaction(s): Not available, Throat Swelling/Closing    Amoxicillin Unknown       Current Medications   Current Outpatient Medications   Medication Sig Dispense Refill    cabergoline (DOSTINEX) 0.5 MG tablet Take 0.5 tablets (0.25 mg) by mouth twice a week 6 tablet 3    losartan-hydrochlorothiazide (HYZAAR) 50-12.5 mg per tablet [LOSARTAN-HYDROCHLOROTHIAZIDE (HYZAAR) 50-12.5 MG PER TABLET] TK 1 T PO QD       No current facility-administered medications for this visit.       Family History:  No family history on file.    Social History:  Social History     Tobacco Use    Smoking status: Never     Passive exposure: Never    Smokeless tobacco: Never   Substance Use Topics    Alcohol use: No       ROS:  Full review of systems taken with the help of the intake sheet. Otherwise a complete 14 point review of systems was taken and is negative unless stated in the history above.      Physical Exam:   Vitals: LMP  (LMP Unknown)   BMI= There is no height or weight on file to calculate BMI.   General: well appearing, no acute distress, pleasant and conversant,   Mental Status/neuro: alert and oriented  Face: symmetrical, normal facial color  Eyes: anicteric, no proptosis or lid lag  Resp: normally breathing    Labs : I reviewed data from epic and extract and summarize the pertinent data here.   Lab  Results   Component Value Date     09/10/2021      Lab Results   Component Value Date    POTASSIUM 4.4 09/10/2021     Lab Results   Component Value Date    CHLORIDE 106 09/10/2021     Lab Results   Component Value Date    LAM 9.1 09/10/2021     Lab Results   Component Value Date    CO2 28 09/10/2021     Lab Results   Component Value Date    BUN 13 09/10/2021     Lab Results   Component Value Date    CR 0.79 09/10/2021     Lab Results   Component Value Date    GLC 94 09/10/2021     Lab Results   Component Value Date    TSH 1.57 01/30/2020     No results found for: T4  No results found for: A1C    No results found for: IGF1  No results found for: LH  No results found for: FSH  No results found for: ESTROGEN  No results found for: PROLACTIN        MRI Brain: I personally reviewed the original images and agree with the below reports.

## 2024-04-25 ENCOUNTER — TELEPHONE (OUTPATIENT)
Dept: ENDOCRINOLOGY | Facility: CLINIC | Age: 44
End: 2024-04-25
Payer: COMMERCIAL

## 2024-04-25 NOTE — TELEPHONE ENCOUNTER
Left Voicemail (1st Attempt) and Sent Mychart (1st Attempt) for the patient to call back and schedule the following:    Appointment type: Return Endocrine  Provider: Dr. Patrick  Return date: 1 year (around April 2025)  Specialty phone number: [phone]  Additional appointment(s) needed: NA  Additonal Notes: NA

## 2024-05-07 ENCOUNTER — LAB (OUTPATIENT)
Dept: LAB | Facility: CLINIC | Age: 44
End: 2024-05-07
Payer: COMMERCIAL

## 2024-05-07 ENCOUNTER — MYC MEDICAL ADVICE (OUTPATIENT)
Dept: ENDOCRINOLOGY | Facility: CLINIC | Age: 44
End: 2024-05-07

## 2024-05-07 DIAGNOSIS — D35.2 PROLACTINOMA (H): Primary | ICD-10-CM

## 2024-05-07 DIAGNOSIS — D35.2 PROLACTINOMA (H): ICD-10-CM

## 2024-05-07 LAB
T4 FREE SERPL-MCNC: 0.92 NG/DL (ref 0.9–1.7)
TSH SERPL DL<=0.005 MIU/L-ACNC: 1.3 UIU/ML (ref 0.3–4.2)

## 2024-05-07 PROCEDURE — 84439 ASSAY OF FREE THYROXINE: CPT | Performed by: PATHOLOGY

## 2024-05-07 PROCEDURE — 84146 ASSAY OF PROLACTIN: CPT | Performed by: INTERNAL MEDICINE

## 2024-05-07 PROCEDURE — 36415 COLL VENOUS BLD VENIPUNCTURE: CPT | Performed by: PATHOLOGY

## 2024-05-07 PROCEDURE — 84443 ASSAY THYROID STIM HORMONE: CPT | Performed by: PATHOLOGY

## 2024-05-07 PROCEDURE — 99000 SPECIMEN HANDLING OFFICE-LAB: CPT | Performed by: PATHOLOGY

## 2024-05-08 LAB — PROLACTIN SERPL 3RD IS-MCNC: 25 NG/ML (ref 5–23)

## 2024-06-07 ENCOUNTER — OFFICE VISIT (OUTPATIENT)
Dept: URGENT CARE | Facility: URGENT CARE | Age: 44
End: 2024-06-07
Payer: COMMERCIAL

## 2024-06-07 VITALS
DIASTOLIC BLOOD PRESSURE: 94 MMHG | OXYGEN SATURATION: 98 % | TEMPERATURE: 97.3 F | BODY MASS INDEX: 43.2 KG/M2 | HEIGHT: 68 IN | RESPIRATION RATE: 16 BRPM | SYSTOLIC BLOOD PRESSURE: 144 MMHG | HEART RATE: 94 BPM

## 2024-06-07 DIAGNOSIS — R22.0 TONGUE SWELLING: Primary | ICD-10-CM

## 2024-06-07 LAB
ALBUMIN SERPL BCG-MCNC: 4.1 G/DL (ref 3.5–5.2)
ALP SERPL-CCNC: 87 U/L (ref 40–150)
ALT SERPL W P-5'-P-CCNC: 10 U/L (ref 0–50)
ANION GAP SERPL CALCULATED.3IONS-SCNC: 10 MMOL/L (ref 7–15)
AST SERPL W P-5'-P-CCNC: 25 U/L (ref 0–45)
BASOPHILS # BLD AUTO: 0.1 10E3/UL (ref 0–0.2)
BASOPHILS NFR BLD AUTO: 1 %
BILIRUB SERPL-MCNC: 0.7 MG/DL
BUN SERPL-MCNC: 15.8 MG/DL (ref 6–20)
CALCIUM SERPL-MCNC: 9 MG/DL (ref 8.6–10)
CHLORIDE SERPL-SCNC: 105 MMOL/L (ref 98–107)
CREAT SERPL-MCNC: 0.82 MG/DL (ref 0.51–0.95)
CRP SERPL-MCNC: 8.29 MG/L
DEPRECATED HCO3 PLAS-SCNC: 26 MMOL/L (ref 22–29)
DEPRECATED S PYO AG THROAT QL EIA: NEGATIVE
EGFRCR SERPLBLD CKD-EPI 2021: >90 ML/MIN/1.73M2
EOSINOPHIL # BLD AUTO: 0.2 10E3/UL (ref 0–0.7)
EOSINOPHIL NFR BLD AUTO: 3 %
ERYTHROCYTE [DISTWIDTH] IN BLOOD BY AUTOMATED COUNT: 13.1 % (ref 10–15)
ERYTHROCYTE [SEDIMENTATION RATE] IN BLOOD BY WESTERGREN METHOD: 18 MM/HR (ref 0–20)
GLUCOSE SERPL-MCNC: 112 MG/DL (ref 70–99)
GROUP A STREP BY PCR: NOT DETECTED
HCT VFR BLD AUTO: 39.5 % (ref 35–47)
HGB BLD-MCNC: 12.4 G/DL (ref 11.7–15.7)
IMM GRANULOCYTES # BLD: 0 10E3/UL
IMM GRANULOCYTES NFR BLD: 0 %
LYMPHOCYTES # BLD AUTO: 1.9 10E3/UL (ref 0.8–5.3)
LYMPHOCYTES NFR BLD AUTO: 29 %
MCH RBC QN AUTO: 27 PG (ref 26.5–33)
MCHC RBC AUTO-ENTMCNC: 31.4 G/DL (ref 31.5–36.5)
MCV RBC AUTO: 86 FL (ref 78–100)
MONOCYTES # BLD AUTO: 0.4 10E3/UL (ref 0–1.3)
MONOCYTES NFR BLD AUTO: 6 %
NEUTROPHILS # BLD AUTO: 3.8 10E3/UL (ref 1.6–8.3)
NEUTROPHILS NFR BLD AUTO: 61 %
PLATELET # BLD AUTO: 282 10E3/UL (ref 150–450)
POTASSIUM SERPL-SCNC: 3.5 MMOL/L (ref 3.4–5.3)
PROT SERPL-MCNC: 6.8 G/DL (ref 6.4–8.3)
RBC # BLD AUTO: 4.6 10E6/UL (ref 3.8–5.2)
SODIUM SERPL-SCNC: 141 MMOL/L (ref 135–145)
WBC # BLD AUTO: 6.3 10E3/UL (ref 4–11)

## 2024-06-07 PROCEDURE — 86140 C-REACTIVE PROTEIN: CPT | Performed by: EMERGENCY MEDICINE

## 2024-06-07 PROCEDURE — 87651 STREP A DNA AMP PROBE: CPT | Performed by: EMERGENCY MEDICINE

## 2024-06-07 PROCEDURE — 99214 OFFICE O/P EST MOD 30 MIN: CPT | Performed by: EMERGENCY MEDICINE

## 2024-06-07 PROCEDURE — 85025 COMPLETE CBC W/AUTO DIFF WBC: CPT | Performed by: EMERGENCY MEDICINE

## 2024-06-07 PROCEDURE — 36415 COLL VENOUS BLD VENIPUNCTURE: CPT | Performed by: EMERGENCY MEDICINE

## 2024-06-07 PROCEDURE — 85652 RBC SED RATE AUTOMATED: CPT | Performed by: EMERGENCY MEDICINE

## 2024-06-07 PROCEDURE — 80053 COMPREHEN METABOLIC PANEL: CPT | Performed by: EMERGENCY MEDICINE

## 2024-06-07 RX ORDER — PREDNISONE 20 MG/1
40 TABLET ORAL DAILY
Qty: 8 TABLET | Refills: 0 | Status: SHIPPED | OUTPATIENT
Start: 2024-06-08 | End: 2024-06-12

## 2024-06-07 RX ORDER — CETIRIZINE HYDROCHLORIDE 10 MG/1
10 TABLET ORAL DAILY
Qty: 30 TABLET | Refills: 0 | Status: SHIPPED | OUTPATIENT
Start: 2024-06-07

## 2024-06-07 RX ORDER — PREDNISONE 20 MG/1
40 TABLET ORAL ONCE
Status: COMPLETED | OUTPATIENT
Start: 2024-06-07 | End: 2024-06-07

## 2024-06-07 RX ORDER — CETIRIZINE HYDROCHLORIDE 10 MG/1
10 TABLET ORAL ONCE
Status: COMPLETED | OUTPATIENT
Start: 2024-06-07 | End: 2024-06-07

## 2024-06-07 RX ADMIN — PREDNISONE 40 MG: 20 TABLET ORAL at 15:32

## 2024-06-07 RX ADMIN — CETIRIZINE HYDROCHLORIDE 10 MG: 10 TABLET ORAL at 15:34

## 2024-06-07 NOTE — PROGRESS NOTES
Assessment & Plan     Diagnosis:    ICD-10-CM    1. Tongue swelling  R22.0 Streptococcus A Rapid Screen w/Reflex to PCR - Clinic Collect     CBC with platelets and differential     Comprehensive metabolic panel (BMP + Alb, Alk Phos, ALT, AST, Total. Bili, TP)     ESR: Erythrocyte sedimentation rate     CRP, inflammation     CBC with platelets and differential     Comprehensive metabolic panel (BMP + Alb, Alk Phos, ALT, AST, Total. Bili, TP)     ESR: Erythrocyte sedimentation rate     CRP, inflammation     Group A Streptococcus PCR Throat Swab     predniSONE (DELTASONE) 20 MG tablet     predniSONE (DELTASONE) tablet 40 mg     cetirizine (zyrTEC) tablet 10 mg     cetirizine (ZYRTEC) 10 MG tablet        Medical Decision Making:  Heather Ramos is a 44 year old female who presents for evaluation of tongue swelling.  Signs and symptoms are consistent with angioedema. No signs of sublingual or dental infection but discussed this cannot be ruled out without advanced imaging. She denies any difficulties breathing or swallowing, is afebrile and no signs of infection. Rapid strep negative. She is on Losartan -- discussed angioedema is quite rare with ARBs, and she also reports she does not take this regularly and has not for a few weeks. She notes symptoms are better in the morning and swelling gets worse throughout the day. It appears symmetrically swollen in the lower parts of the tongue. CBC unremarkable. CRP minimally elevated. ESR within normal limits. Labs otherwise non-contributory.     Patient was given prednisone and zyrtec here. Has not had any worsening of her condition. No dysphagia or breathing concerns. Will have her continue to avoid taking her losartan, discussed close PCP follow up for recheck (has appointment on Tuesday 6/11/2024); can discuss if referral to oral surgery or ENT is subsequently warranted.  The cause is unclear but there is no family history to suggest C1-esterase deficiency. Go to the ER  "for worsening angioedema or anaphylactic symptoms, fever or infectious symptoms. Patient verbalizes understanding and agreement with the plan including reasons to go to the ER. All questions answered.     Michael Jung PA-C  [facility]    Subjective     Heather Ramos is a 44 year old female who presents to clinic today for the following health issues:  Chief Complaint   Patient presents with    Urgent Care    Oral Swelling     Patient presents with her tongue swollen and painful for 3x days.       HPI  Patient states that she is experiencing waxing and waning tongue swelling \"on the bottom and sides\" for the past 3 days; appears symmetric and is worse later in the day than the morning. Has been taking Benadryl at night for relief. She notes that she is not on any new medications, is not very good about taking her losartan/hydrochlorothiazide, has not taken this for weeks.  She was told open no pain with swallowing on the right side, but is able to get food and water down just fine.  She denies any difficulties breathing, fevers, dental pain, ear pain, recent upper respiratory infection.  Did have strep 2 months ago, but was treated for this.  She denies any history of similar tongue swelling, or any allergic reactions.  No lip swelling noted, rashes on the body, itching or other symptoms.    Review of Systems    See HPI    Objective      Vitals: BP (!) 144/94 (BP Location: Right arm)   Pulse 94   Temp 97.3  F (36.3  C) (Temporal)   Resp 16   Ht 1.727 m (5' 8\")   SpO2 98%   BMI 43.20 kg/m      Patient Vitals for the past 24 hrs:   BP Temp Temp src Pulse Resp SpO2 Height   06/07/24 1442 (!) 144/94 97.3  F (36.3  C) Temporal 94 16 98 % 1.727 m (5' 8\")       Vital signs reviewed by: Michael Jung PA-C    Physical Exam   Constitutional: Patient is alert and cooperative. No acute distress.  Ears: Right TM is normal. Left TM is normal. External ear canals are normal.  Mouth: tongue " symmetrically swollen; mild. No lesions/ulcerations. Mucous membranes are moist. Normal tonsil. Posterior oropharynx is clear.  Eyes: Conjunctivae, EOMI and lids are normal.  Cardiovascular: Regular rate and rhythm  Pulmonary/Chest: Lungs are clear to auscultation throughout. Effort normal. No respiratory distress. No wheezes, rales or rhonchi.  Neurological: Alert and oriented x3. CN 3-7 and 9-12 intact.  Skin: No rash noted on visualized skin.  Psychiatric:The patient has a normal mood and affect.     Labs/Imaging:  Results for orders placed or performed in visit on 06/07/24   Comprehensive metabolic panel (BMP + Alb, Alk Phos, ALT, AST, Total. Bili, TP)     Status: Abnormal   Result Value Ref Range    Sodium 141 135 - 145 mmol/L    Potassium 3.5 3.4 - 5.3 mmol/L    Carbon Dioxide (CO2) 26 22 - 29 mmol/L    Anion Gap 10 7 - 15 mmol/L    Urea Nitrogen 15.8 6.0 - 20.0 mg/dL    Creatinine 0.82 0.51 - 0.95 mg/dL    GFR Estimate >90 >60 mL/min/1.73m2    Calcium 9.0 8.6 - 10.0 mg/dL    Chloride 105 98 - 107 mmol/L    Glucose 112 (H) 70 - 99 mg/dL    Alkaline Phosphatase 87 40 - 150 U/L    AST 25 0 - 45 U/L    ALT 10 0 - 50 U/L    Protein Total 6.8 6.4 - 8.3 g/dL    Albumin 4.1 3.5 - 5.2 g/dL    Bilirubin Total 0.7 <=1.2 mg/dL   ESR: Erythrocyte sedimentation rate     Status: Normal   Result Value Ref Range    Erythrocyte Sedimentation Rate 18 0 - 20 mm/hr   CRP, inflammation     Status: Abnormal   Result Value Ref Range    CRP Inflammation 8.29 (H) <5.00 mg/L   CBC with platelets and differential     Status: Abnormal   Result Value Ref Range    WBC Count 6.3 4.0 - 11.0 10e3/uL    RBC Count 4.60 3.80 - 5.20 10e6/uL    Hemoglobin 12.4 11.7 - 15.7 g/dL    Hematocrit 39.5 35.0 - 47.0 %    MCV 86 78 - 100 fL    MCH 27.0 26.5 - 33.0 pg    MCHC 31.4 (L) 31.5 - 36.5 g/dL    RDW 13.1 10.0 - 15.0 %    Platelet Count 282 150 - 450 10e3/uL    % Neutrophils 61 %    % Lymphocytes 29 %    % Monocytes 6 %    % Eosinophils 3 %    %  Basophils 1 %    % Immature Granulocytes 0 %    Absolute Neutrophils 3.8 1.6 - 8.3 10e3/uL    Absolute Lymphocytes 1.9 0.8 - 5.3 10e3/uL    Absolute Monocytes 0.4 0.0 - 1.3 10e3/uL    Absolute Eosinophils 0.2 0.0 - 0.7 10e3/uL    Absolute Basophils 0.1 0.0 - 0.2 10e3/uL    Absolute Immature Granulocytes 0.0 <=0.4 10e3/uL   Streptococcus A Rapid Screen w/Reflex to PCR - Clinic Collect     Status: Normal    Specimen: Throat; Swab   Result Value Ref Range    Group A Strep antigen Negative Negative   CBC with platelets and differential     Status: Abnormal    Narrative    The following orders were created for panel order CBC with platelets and differential.  Procedure                               Abnormality         Status                     ---------                               -----------         ------                     CBC with platelets and d...[[id number]]  Abnormal            Final result                 Please view results for these tests on the individual orders.     Interventions:  Prednisone 40mg PO  Zyrtec 10mg PO    Michael Jung PA-C, June 7, 2024

## 2024-06-07 NOTE — PATIENT INSTRUCTIONS
Zyrtec 10mg and Prednisone daily for the next 4 days. Avoid taking the Losartan-hydrochlorothiazide and discuss with your PCP about hypertension management and tongue swelling -- may need referral to oral surgery or ENT if not improving.

## 2024-06-08 ENCOUNTER — HOSPITAL ENCOUNTER (EMERGENCY)
Facility: CLINIC | Age: 44
Discharge: HOME | End: 2024-06-08
Attending: EMERGENCY MEDICINE | Admitting: EMERGENCY MEDICINE
Payer: COMMERCIAL

## 2024-06-08 ENCOUNTER — NURSE TRIAGE (OUTPATIENT)
Dept: NURSING | Facility: CLINIC | Age: 44
End: 2024-06-08
Payer: COMMERCIAL

## 2024-06-08 VITALS
HEART RATE: 86 BPM | HEIGHT: 68 IN | TEMPERATURE: 98.8 F | BODY MASS INDEX: 43.04 KG/M2 | SYSTOLIC BLOOD PRESSURE: 148 MMHG | WEIGHT: 284 LBS | RESPIRATION RATE: 16 BRPM | OXYGEN SATURATION: 96 % | DIASTOLIC BLOOD PRESSURE: 70 MMHG

## 2024-06-08 DIAGNOSIS — T78.3XXD ANGIOEDEMA, SUBSEQUENT ENCOUNTER: ICD-10-CM

## 2024-06-08 PROCEDURE — 99283 EMERGENCY DEPT VISIT LOW MDM: CPT

## 2024-06-08 RX ORDER — EPINEPHRINE 0.3 MG/.3ML
0.3 INJECTION SUBCUTANEOUS PRN
Qty: 2 EACH | Refills: 0 | Status: SHIPPED | OUTPATIENT
Start: 2024-06-08

## 2024-06-08 ASSESSMENT — ENCOUNTER SYMPTOMS
FEVER: 0
CHEST TIGHTNESS: 0
STRIDOR: 0
FACIAL SWELLING: 0
TROUBLE SWALLOWING: 0
WHEEZING: 0
COUGH: 0
SHORTNESS OF BREATH: 0
ABDOMINAL PAIN: 0
VOICE CHANGE: 0
SORE THROAT: 0

## 2024-06-08 ASSESSMENT — COLUMBIA-SUICIDE SEVERITY RATING SCALE - C-SSRS
6. HAVE YOU EVER DONE ANYTHING, STARTED TO DO ANYTHING, OR PREPARED TO DO ANYTHING TO END YOUR LIFE?: NO
1. IN THE PAST MONTH, HAVE YOU WISHED YOU WERE DEAD OR WISHED YOU COULD GO TO SLEEP AND NOT WAKE UP?: NO
2. HAVE YOU ACTUALLY HAD ANY THOUGHTS OF KILLING YOURSELF IN THE PAST MONTH?: NO

## 2024-06-08 ASSESSMENT — ACTIVITIES OF DAILY LIVING (ADL): ADLS_ACUITY_SCORE: 35

## 2024-06-08 NOTE — Clinical Note
Heather Ramos was seen and treated in our emergency department on 6/8/2024.  She may return to work on 06/11/2024.       If you have any questions or concerns, please don't hesitate to call.      Elba Love, ELLEN

## 2024-06-08 NOTE — TELEPHONE ENCOUNTER
Nurse Triage SBAR    Is this a 2nd Level Triage? NO    Situation: tongue and mouth pain    Background: tongue and mouth pain    Assessment: Hurts to have food touch her tongue, feels like the back of her mouth is more closed, denies difficulty breathing, no fever, rates pain 7-8/10 with eating. No voice changes, when she yells, her tongue hurts.    Protocol Recommended Disposition:   Go to ED Now    Recommendation:   Recommend patient be seen at the Emergency Room.  Names and locations of nearest Emergency Rooms were given to patient.  Patient verbalized understanding information.       Does the patient meet one of the following criteria for ADS visit consideration? 16+ years old, no PCP (internal or external) but seen at [facility]     TIP  Providers, please consider if this condition is appropriate for management at one of our Acute and Diagnostic Services sites.     If patient is a good candidate, please use dotphrase <dot>triageresponse and select Refer to ADS to document.    Reason for Disposition   Tongue is very swollen and tender    Additional Information   Negative: SEVERE difficulty breathing (e.g., struggling for each breath, speaks in single words, stridor)   Negative: Sounds like a life-threatening emergency to the triager   Negative: [1] Drooling or spitting out saliva (because can't swallow) AND [2] new-onset   Negative: Electrical burn of mouth   Negative: Chemical burn of mouth    Protocols used: Mouth Pain-A-

## 2024-06-08 NOTE — ED TRIAGE NOTES
Patient reports oral tongue and throat swelling x 4-5 days. Was seen yesterday at urgent care and diagnosed with angioedema, they think its related to her blood pressure medication. Was given prednisone and zyrtec and symptoms have not improved      Triage Assessment (Adult)       Row Name 06/08/24 5158          Triage Assessment    Airway WDL airway symptoms     Airway Symptoms other (see comments)  patient reports swollen tonuge and throat        Respiratory WDL    Respiratory WDL WDL        Skin Circulation/Temperature WDL    Skin Circulation/Temperature WDL WDL        Cardiac WDL    Cardiac WDL WDL        Peripheral/Neurovascular WDL    Peripheral Neurovascular WDL WDL        Cognitive/Neuro/Behavioral WDL    Cognitive/Neuro/Behavioral WDL WDL

## 2024-06-08 NOTE — ED PROVIDER NOTES
EMERGENCY DEPARTMENT ENCOUNTER       ED Course & Medical Decision Making       I saw and examined the patient.  She does not have any outward signs of significant angioedema.  Posterior oropharynx is symmetric and widely patent.  There is no sign of dental infection or Bandar's angina.    I reviewed her urgent care visit from earlier this week.    She is already taking prednisone and sertraline.  I told her she can add on Benadryl if she would like.    It is unclear if this was due to her losartan but she should stay off of this medication just in case.  This may be some other allergen that she is being reexposed to and I instructed her to keep a journal of things that she is exposed to throughout the day and I will refer her to an allergist.    I did also discuss if this could be an endocrine problem as she does have history of prolactinoma and I reviewed her records and her prolactin level has been falling with recent 1 at 25 about 1 month ago    She should follow-up with her endocrinologist as well      In the event that this is something that she could have subsequent worsening I gave her education on indications and instructions on self administration for EpiPen and I will provide a prescription    Plan is to discharge her to follow-up as indicated above    Medical Decision Making  Obtained supplemental history:Supplemental history obtained?: No  Reviewed external records: External records reviewed?: Documented in chart  Care impacted by chronic illness:N/A  Care significantly affected by social determinants of health:N/A      Consultation discussion with other provider:  Discharge. I prescribed additional prescription strength medication(s) as charted. See documentation for any additional details.        Prior to making a final disposition on this patient the results of patient's tests and other diagnostic studies were discussed with the patient. All questions were answered. Patient expressed understanding of  the plan and was amenable to it.    Medications - No data to display    Final Impression     1. Angioedema, subsequent encounter            Chief Complaint     Chief Complaint   Patient presents with    Oral Swelling       Patient reports oral tongue and throat swelling x 4-5 days. Was seen yesterday at urgent care and diagnosed with angioedema, they think its related to her blood pressure medication. Was given prednisone and zyrtec and symptoms have not improved      Triage Assessment (Adult)       Row Name 06/08/24 1934          Triage Assessment    Airway WDL airway symptoms     Airway Symptoms other (see comments)  patient reports swollen tonuge and throat        Respiratory WDL    Respiratory WDL WDL        Skin Circulation/Temperature WDL    Skin Circulation/Temperature WDL WDL        Cardiac WDL    Cardiac WDL WDL        Peripheral/Neurovascular WDL    Peripheral Neurovascular WDL WDL        Cognitive/Neuro/Behavioral WDL    Cognitive/Neuro/Behavioral WDL WDL                         HPI       Heather Ramos is a pleasant 44 year old female presents to the emergency department today for evaluation of tongue swelling.  She states that she noticed this a couple of days ago and she was seen in urgent care.  They felt that it might be related to her losartan and told her to discontinue this medication.  They also prescribed sertraline and prednisone.  She has been taking these medications but she does not feel like it has been helping.    No difficulty breathing, abdominal pain, GI issues, problems swallowing or changes in her voice.    No fevers.  She recently saw dentist and she had no dental problems.  She has no dental pain    No other complaints at this time    Past Medical History     Past Medical History:   Diagnosis Date    Abnormal Pap smear of cervix 2009    Hypertension      Past Surgical History:   Procedure Laterality Date    CYST REMOVAL Right     Wrist    TONSILLECTOMY      WISDOM TOOTH EXTRACTION  "      No family history on file.   Social History     Tobacco Use    Smoking status: Never     Passive exposure: Never    Smokeless tobacco: Never   Substance Use Topics    Alcohol use: No    Drug use: No       Relevant past medical, surgical, family and social history as documented above, has been reviewed and discussed with patient. No changes or additions, unless otherwise noted in the HPI.    Current Medications     EPINEPHrine (ANY BX GENERIC EQUIV) 0.3 MG/0.3ML injection 2-pack  cabergoline (DOSTINEX) 0.5 MG tablet  cetirizine (ZYRTEC) 10 MG tablet  losartan-hydrochlorothiazide (HYZAAR) 50-12.5 mg per tablet  predniSONE (DELTASONE) 20 MG tablet        Allergies     Allergies   Allergen Reactions    Penicillins Unknown, Anaphylaxis and Rash     Other Reaction(s): Not available, Throat Swelling/Closing    Amoxicillin Unknown       Review of Systems     Review of Systems   Constitutional:  Negative for fever.   HENT:  Negative for dental problem, drooling, facial swelling, sore throat, trouble swallowing and voice change.    Respiratory:  Negative for cough, chest tightness, shortness of breath, wheezing and stridor.    Cardiovascular:  Negative for chest pain.   Gastrointestinal:  Negative for abdominal pain.   Skin:  Negative for rash.   All other systems reviewed and are negative.       Remainder of systems reviewed, unless noted in HPI all others negative.    Physical Exam     BP (!) 148/70   Pulse 86   Temp 98.8  F (37.1  C) (Oral)   Resp 16   Ht 1.727 m (5' 8\")   Wt 128.8 kg (284 lb)   LMP 06/04/2024   SpO2 96%   BMI 43.18 kg/m      Physical Exam  Vitals and nursing note reviewed.   HENT:      Head: Normocephalic.      Nose: Nose normal.      Mouth/Throat:      Mouth: Mucous membranes are moist. No oral lesions or angioedema.      Dentition: Normal dentition.      Tongue: Tongue does not deviate from midline.      Pharynx: Oropharynx is clear. Uvula midline. No pharyngeal swelling, oropharyngeal " exudate, posterior oropharyngeal erythema or uvula swelling.      Tonsils: No tonsillar exudate or tonsillar abscesses.      Comments: Class I Mallampati  Cardiovascular:      Rate and Rhythm: Normal rate.   Pulmonary:      Effort: Pulmonary effort is normal.   Neurological:      Mental Status: She is alert. Mental status is at baseline.   Psychiatric:         Mood and Affect: Mood normal.             Labs & Imaging         Labs Ordered and Resulted from Time of ED Arrival to Time of ED Departure - No data to display           Gallo Fernandez MD  Emergency Medicine  [facility] EMERGENCY ROOM  [address] [facility] [address]  [phone]  6/8/2024         Gallo Fernandez MD  06/08/24 8888

## 2024-07-02 ENCOUNTER — HOSPITAL ENCOUNTER (EMERGENCY)
Facility: CLINIC | Age: 44
Discharge: HOME | End: 2024-07-02
Attending: EMERGENCY MEDICINE | Admitting: EMERGENCY MEDICINE
Payer: COMMERCIAL

## 2024-07-02 VITALS
OXYGEN SATURATION: 98 % | SYSTOLIC BLOOD PRESSURE: 177 MMHG | TEMPERATURE: 97.6 F | RESPIRATION RATE: 17 BRPM | HEART RATE: 80 BPM | WEIGHT: 280 LBS | HEIGHT: 68 IN | BODY MASS INDEX: 42.44 KG/M2 | DIASTOLIC BLOOD PRESSURE: 99 MMHG

## 2024-07-02 DIAGNOSIS — R22.0 TONGUE SWELLING: ICD-10-CM

## 2024-07-02 PROCEDURE — 99284 EMERGENCY DEPT VISIT MOD MDM: CPT

## 2024-07-02 PROCEDURE — 250N000012 HC RX MED GY IP 250 OP 636 PS 637

## 2024-07-02 PROCEDURE — 250N000013 HC RX MED GY IP 250 OP 250 PS 637

## 2024-07-02 RX ORDER — METHYLPREDNISOLONE 4 MG
TABLET, DOSE PACK ORAL
Qty: 21 TABLET | Refills: 0 | Status: SHIPPED | OUTPATIENT
Start: 2024-07-02

## 2024-07-02 RX ORDER — DIPHENHYDRAMINE HCL 25 MG
25 CAPSULE ORAL ONCE
Status: COMPLETED | OUTPATIENT
Start: 2024-07-02 | End: 2024-07-02

## 2024-07-02 RX ORDER — FAMOTIDINE 20 MG/1
20 TABLET, FILM COATED ORAL 2 TIMES DAILY
Qty: 20 TABLET | Refills: 0 | Status: SHIPPED | OUTPATIENT
Start: 2024-07-02 | End: 2024-07-12

## 2024-07-02 RX ORDER — PREDNISONE 20 MG/1
20 TABLET ORAL ONCE
Status: COMPLETED | OUTPATIENT
Start: 2024-07-02 | End: 2024-07-02

## 2024-07-02 RX ADMIN — PREDNISONE 20 MG: 20 TABLET ORAL at 23:06

## 2024-07-02 RX ADMIN — DIPHENHYDRAMINE HYDROCHLORIDE 25 MG: 25 CAPSULE ORAL at 23:06

## 2024-07-02 ASSESSMENT — ENCOUNTER SYMPTOMS
VOICE CHANGE: 0
TROUBLE SWALLOWING: 0

## 2024-07-02 NOTE — Clinical Note
Heather Ramos was seen and treated in our emergency department on 7/2/2024.  She may return to work on 07/04/2024.       If you have any questions or concerns, please don't hesitate to call.      Gallo Fernandez MD

## 2024-07-03 NOTE — ED TRIAGE NOTES
Pt reporting tongue swelling over the last few days. This has occurred previously from Losartan prescription, was discontinued and prescribed amlodipine. Has not been taking amlodipine. Did not take tylenol, ibuprofen, or benadryl tonight for the discomfort. Pt not in respiratory distress. Able to talk appropriately. RR 19.      Triage Assessment (Adult)       Row Name 07/02/24 2526          Triage Assessment    Airway WDL WDL        Respiratory WDL    Respiratory WDL X  Pt reporting tongue swelling        Skin Circulation/Temperature WDL    Skin Circulation/Temperature WDL WDL        Cardiac WDL    Cardiac WDL WDL        Peripheral/Neurovascular WDL    Peripheral Neurovascular WDL WDL        Cognitive/Neuro/Behavioral WDL    Cognitive/Neuro/Behavioral WDL WDL

## 2024-07-03 NOTE — ED PROVIDER NOTES
EMERGENCY DEPARTMENT ENCOUNTER      NAME: Heather Ramos  AGE: 44 year old female  YOB: 1980  MRN: [id number]  EVALUATION DATE & TIME: No admission date for patient encounter.    PCP: Lio Gregorio    ED PROVIDER: Alexandra Kim PA-C      Chief Complaint   Patient presents with    Oral Swelling     FINAL IMPRESSION:  1. Tongue swelling      ED COURSE & MEDICAL DECISION MAKING:    Pertinent Labs & Imaging studies reviewed. (See chart for details)  44 year old female presents to the Emergency Department for evaluation of tongue swelling.  Per chart review on 6/8/2024 patient was seen in liquids and ED for angioedema.  Patient was taking losartan at that time.  Patient was encouraged to discontinue her losartan.  Patient was seen by her primary care doctor and switched to amlodipine.  Patient reports for the past 3 days patient has noted tongue swelling.  Patient still complains of bilateral tongue pain.  She reports that she has not had any difficulty breathing or swallowing.  She had 1 episode of sharp cramp in her abdominal this morning and had 1 episode of diarrhea shortly after.  Abdominal pain and diarrhea has completely resolved.  Patient reports she just had her menstrual cycle.  She denies any chance of pregnancy.  Vital signs reviewed and patient is hypertensive.  Afebrile.  On exam, normal phonation.  No trismus.  Posterior oropharynx is patent. Uvula was midline.  Trachea is midline.  No tonsillar exudate or edema.  Normal range of motion of the neck.  No lip swelling or tongue swelling or throat swelling..  Cardiac and pulm exams unremarkable.  Patient is resting comfortably.  No difficulty with breathing.  No signs of Dental abscess, anaphylaxis or angioedema.  No signs of Bandar legs or peritonsillar abscess.    Received a dose of prednisone and Benadryl here in the emergency room.  Patient reports that she is resting comfortably and has no complaints at this time.  Her previous  visit my attending saw the patient and reports that nearly identical presentation from a month ago. Patient was referred to an allergist as well as an endocrinologist.  Patient was prescribed a Medrol Dosepak and Pepcid.  Patient has benadryl at home. Patient was educated on her treatment plan and prescribed medication.  Patient will return to the ED if new symptoms develop or symptoms worsen.  Patient agrees with plan.  All questions answered.  Strict return precautions were provided to the patient.    ED COURSE:   10:47 PM I saw the patient. Staffed with Dr. Fernandez.   11:29 PM Dr. Fernandez discharged the patient.  Patient better after medication.  Patient be discharged home.  Patient agrees with plan.  All questions answered.    At the conclusion of the encounter I discussed the results of all of the tests and the disposition. The questions were answered. The patient or family acknowledged understanding and was agreeable with the care plan.     0 minutes of critical care time       Medical Decision Making  Obtained supplemental history:Supplemental history obtained?: Documented in chart  Reviewed external records: External records reviewed?: Documented in chart  Care impacted by chronic illness:Hypertension and Other: pituitary tumor  Care significantly affected by social determinants of health:N/A  Did you consider but not order tests?: Work up considered but not performed and documented in chart, if applicable  Did you interpret images independently?: Independent interpretation of ECG and images noted in documentation, when applicable.  Consultation discussion with other provider:Did you involve another provider (consultant, , pharmacy, etc.)?: I discussed the care with another health care provider, see documentation for details.  Discharge. I prescribed additional prescription strength medication(s) as charted. See documentation for any additional details.      MEDICATIONS GIVEN IN THE EMERGENCY:  Medications  "  diphenhydrAMINE (BENADRYL) capsule 25 mg (25 mg Oral $Given 7/2/24 2306)   predniSONE (DELTASONE) tablet 20 mg (20 mg Oral $Given 7/2/24 2306)       NEW PRESCRIPTIONS STARTED AT TODAY'S ER VISIT  New Prescriptions    FAMOTIDINE (PEPCID) 20 MG TABLET    Take 1 tablet (20 mg) by mouth 2 times daily for 10 days    METHYLPREDNISOLONE (MEDROL DOSEPAK) 4 MG TABLET THERAPY PACK    Follow Package Directions       =================================================================    HPI    Patient information was obtained from: Patient    Use of : N/A         Heather Ramos is a 44 year old female with a pertinent history of pituitary tumor, hypertension, s/p tonsillectomy  who presents to this ED via walk-in for evaluation of tongue swelling.    Patient reports tongue swelling and pain starting a couple days ago. She has pain on the bilateral sides and tip of her tongue. She was seen for angioedema on 6/8 and states that she was on losartan at the time, which they were unsure if it was related to her BP meds.  Patient was encouraged to follow-up with an allergist as well as an endocrinologist.  Unfortunately patient has not followed up with either.  She followed with her PCP at [facility] who switched her to Amlodipine. She has not been taking her BP medications as prescribed. At the time, she also had 5 second episode of abdominal cramping and 1 episode of diarrhea, and was on her period. This evening, she experienced similar symptoms. She developed a \"sharp\" abdominal cramping pain in her abdomen and had one episode of diarrhea (symptoms have completely resolved).  Patient is also currently on her period. She has a history of a pituitary tumor and is taking medication to \"shrink\" the tumor. Patient denies throat pain or decreased tongue sensation. No fevers, chills, chest pain, shortness of breath, nausea, vomiting, and any other symptoms.    Per chart review, patient was seen at [facility] ED for angioedema on " "6/8/24. She noticed this a couple of days ago and she was seen in urgent care. They felt that it might be related to her losartan and told her to discontinue this medication. It is unsure if this was due to her losartan she should stay off of this medication just in case. Discharged.      REVIEW OF SYSTEMS   As per HPI    PAST MEDICAL HISTORY:  Past Medical History:   Diagnosis Date    Abnormal Pap smear of cervix 2009    Hypertension        PAST SURGICAL HISTORY:  Past Surgical History:   Procedure Laterality Date    CYST REMOVAL Right     Wrist    TONSILLECTOMY      WISDOM TOOTH EXTRACTION             CURRENT MEDICATIONS:    famotidine (PEPCID) 20 MG tablet  methylPREDNISolone (MEDROL DOSEPAK) 4 MG tablet therapy pack  cabergoline (DOSTINEX) 0.5 MG tablet  cetirizine (ZYRTEC) 10 MG tablet  EPINEPHrine (ANY BX GENERIC EQUIV) 0.3 MG/0.3ML injection 2-pack  losartan-hydrochlorothiazide (HYZAAR) 50-12.5 mg per tablet        ALLERGIES:  Allergies   Allergen Reactions    Penicillins Unknown, Anaphylaxis and Rash     Other Reaction(s): Not available, Throat Swelling/Closing    Amoxicillin Unknown    Losartan Angioedema       FAMILY HISTORY:  No family history on file.    SOCIAL HISTORY:   Social History     Socioeconomic History    Marital status: Single   Tobacco Use    Smoking status: Never     Passive exposure: Never    Smokeless tobacco: Never   Substance and Sexual Activity    Alcohol use: No    Drug use: No       VITALS:  BP (!) 177/99   Pulse 80   Temp 97.6  F (36.4  C) (Temporal)   Resp 19   Ht 1.727 m (5' 8\")   Wt 127 kg (280 lb)   LMP 06/04/2024   SpO2 98%   BMI 42.57 kg/m      PHYSICAL EXAM    Physical Exam  Vitals and nursing note reviewed.   Constitutional:       General: She is not in acute distress.     Appearance: Normal appearance. She is not ill-appearing, toxic-appearing or diaphoretic.   HENT:      Head: Atraumatic.      Jaw: There is normal jaw occlusion. No trismus, tenderness, swelling, " pain on movement or malocclusion.      Right Ear: External ear normal.      Left Ear: External ear normal.      Nose: Nose normal.      Mouth/Throat:      Mouth: Mucous membranes are moist. No angioedema.      Dentition: Normal dentition.      Tongue: No lesions. Tongue does not deviate from midline.      Palate: No mass and lesions.      Pharynx: Oropharynx is clear. Uvula midline.      Tonsils: No tonsillar exudate or tonsillar abscesses.      Comments: Palate is soft. No tongue swelling.   Eyes:      Conjunctiva/sclera: Conjunctivae normal.      Pupils: Pupils are equal, round, and reactive to light.   Cardiovascular:      Rate and Rhythm: Normal rate and regular rhythm.      Pulses: Normal pulses.      Heart sounds: Normal heart sounds. No murmur heard.     No friction rub. No gallop.   Pulmonary:      Effort: Pulmonary effort is normal.      Breath sounds: Normal breath sounds. No wheezing or rales.   Abdominal:      Tenderness: There is no abdominal tenderness. There is no guarding or rebound.   Musculoskeletal:      Cervical back: Normal range of motion. No rigidity.   Lymphadenopathy:      Cervical: No cervical adenopathy.   Skin:     General: Skin is dry.   Neurological:      Mental Status: She is alert. Mental status is at baseline.   Psychiatric:         Mood and Affect: Mood normal.         Thought Content: Thought content normal.          LAB:  All pertinent labs reviewed and interpreted.  Labs Ordered and Resulted from Time of ED Arrival to Time of ED Departure - No data to display     RADIOLOGY:  Reviewed all pertinent imaging. Please see official radiology report.  No orders to display       IJosie, am serving as a scribe to document services personally performed by Alexandra Kim PA-C, based on my observation and the provider's statements to me. IAlexandra PA-C, attest that Josie Garza is acting in a scribe capacity, has observed my performance of the services and has  documented them in accordance with my direction.    [facility] EMERGENCY ROOM  [address] [facility] [address] [phone]       Alexandra Kim PA-C  07/03/24 0001       Alexandra Kim PA-C  07/03/24 0002

## 2024-07-03 NOTE — DISCHARGE INSTRUCTIONS
Hydrate.  I prescribed you Pepcid as well as a Medrol Dosepak.  Please take your medications as prescribed.  Return to the ED if new symptoms develop or symptoms worsen.  Follow-up with your primary care doctor discuss your ED visit. I have also referred you to an allergist and an endocrinologist.  Please follow-up with the allergist as well as the endocrinologist as soon as possible.

## 2024-07-03 NOTE — ED PROVIDER NOTES
Emergency Department JANNETH Supervisory Note     I had a face to face encounter with this patient who was also seen by the JANNETH (PA / NP) who is currently serving as a JANNETH provider in the Emergency Department. I have seen, examined, and discussed the patient with the JANNETH and agree with their assessment and plan of management. Please refer to the JANNETH note for further details and ED course.     Brief HPI:     Heather Ramos is a 44 year old female who presents for evaluation of 3 days of tongue swelling, no difficulty breathing. She is on amlodipine. This has previously occurred with a Losartan prescription, which she has stopped taking.    I, Rogers Orozco, am serving as a scribe to document services personally performed by Gallo Fernandez MD, based on my observations and the provider's statements to me.   I, Gallo Fernandez MD, attest that Rogers Orozco was acting in a scribe capacity, has observed my performance of the services and has documented them in accordance with my direction.    Brief Review of Systems:  Review of Systems   HENT:  Negative for dental problem, drooling, mouth sores, trouble swallowing and voice change.         Subjective tongue swelling          Brief Physical Exam:  Physical Exam  Vitals and nursing note reviewed.   HENT:      Head: Normocephalic.      Comments: There is no appreciable angioedema of lips, tongue, posterior oropharynx.  Voice is normal.  No sign of dental abscess, anaphylaxis, angioedema, peritonsillar abscess or Bandar     Nose: Nose normal.   Cardiovascular:      Rate and Rhythm: Normal rate.   Pulmonary:      Effort: Pulmonary effort is normal.   Neurological:      Mental Status: She is alert. Mental status is at baseline.   Psychiatric:         Mood and Affect: Mood normal.         ED Course/MDM:  10:57 PM Heather Ramos was staffed with me. I agree with their assessment and plan of management, and I will see the patient.  I met with the patient to introduce myself,  gather additional history, perform my initial exam, and discuss the plan.     I saw and examined the patient.  This is nearly identical presentation to when I saw her for the same thing 1 month ago.  She did well on a course of prednisone during that visit which would favor that this is allergic.  At the previous visit I asked her to follow-up with an allergist and her endocrinologist but she has not been able to do that yet.  She will try to get that done and we will do the same course as last time, however I do not see that she would benefit from any diagnostics here and I do not see any emergent condition         1. Tongue swelling        Labs and Imaging:     I have reviewed the relevant laboratory and radiology studies      Gallo Fernandez MD  [facility] EMERGENCY ROOM  [facility] [facility] [address] [phone]       Gallo Fernandez MD  07/02/24 5429

## 2024-07-05 ENCOUNTER — TELEPHONE (OUTPATIENT)
Dept: FAMILY MEDICINE | Facility: CLINIC | Age: 44
End: 2024-07-05

## 2024-07-05 ENCOUNTER — LAB (OUTPATIENT)
Dept: LAB | Facility: CLINIC | Age: 44
End: 2024-07-05
Attending: PHYSICIAN ASSISTANT
Payer: COMMERCIAL

## 2024-07-05 ENCOUNTER — E-VISIT (OUTPATIENT)
Dept: URGENT CARE | Facility: CLINIC | Age: 44
End: 2024-07-05
Payer: COMMERCIAL

## 2024-07-05 DIAGNOSIS — J02.9 SORE THROAT: ICD-10-CM

## 2024-07-05 DIAGNOSIS — J02.0 ACUTE STREPTOCOCCAL PHARYNGITIS: Primary | ICD-10-CM

## 2024-07-05 LAB — DEPRECATED S PYO AG THROAT QL EIA: POSITIVE

## 2024-07-05 PROCEDURE — 99421 OL DIG E/M SVC 5-10 MIN: CPT | Performed by: PHYSICIAN ASSISTANT

## 2024-07-05 PROCEDURE — 87880 STREP A ASSAY W/OPTIC: CPT | Performed by: PATHOLOGY

## 2024-07-05 RX ORDER — AZITHROMYCIN 250 MG/1
TABLET, FILM COATED ORAL
Qty: 6 TABLET | Refills: 0 | Status: SHIPPED | OUTPATIENT
Start: 2024-07-05 | End: 2024-07-10

## 2024-07-05 NOTE — PATIENT INSTRUCTIONS
Dear Heather,    After reviewing your responses, I would like you to come in for a swab to make sure we treat you correctly. This swab is to evaluate you for possible Strep Throat, and should be scheduled for today or tomorrow. Please use the Schedule Now button in [employer] to schedule your swab. Otherwise, click this link to schedule a lab only appointment.    Lab appointments are not available at most locations on the weekends. If no Lab Only appointment is available, you should be seen in any of our convenient Urgent Care Centers for an in person visit, which can be found on our website here.    You will receive instructions with your results in [employer] once they are available.     If your symptoms worsen, you develop difficulty breathing, difficulty with drinking enough to stay hydrated, difficulty swallowing your saliva or have fevers for more than 5 days, please contact your primary care provider for an appointment or visit an Urgent Care Center to be seen.      Thanks again for choosing us as your health care partner.   Suma Allred PA-C

## 2024-07-05 NOTE — TELEPHONE ENCOUNTER
Pt calling wanting to ask about getting a strep test done as her son was recently positive.     Writer informed pt can submit an E-Visit for this, schedule an appt or be seen in urgent care/ [facility] if there are concerns.     Pt verbalized understanding and thanked for the call.

## 2024-10-18 ENCOUNTER — LAB (OUTPATIENT)
Dept: LAB | Facility: CLINIC | Age: 44
End: 2024-10-18
Payer: COMMERCIAL

## 2024-10-18 ENCOUNTER — MYC MEDICAL ADVICE (OUTPATIENT)
Dept: ENDOCRINOLOGY | Facility: CLINIC | Age: 44
End: 2024-10-18

## 2024-10-18 DIAGNOSIS — D35.2 PROLACTINOMA (H): ICD-10-CM

## 2024-10-18 DIAGNOSIS — E22.1 HYPERPROLACTINEMIA (H): Primary | ICD-10-CM

## 2024-10-18 PROCEDURE — 36415 COLL VENOUS BLD VENIPUNCTURE: CPT

## 2024-10-18 PROCEDURE — 84146 ASSAY OF PROLACTIN: CPT

## 2024-10-19 LAB — PROLACTIN SERPL 3RD IS-MCNC: 59 NG/ML (ref 5–23)

## 2024-10-25 RX ORDER — BROMOCRIPTINE MESYLATE 5 MG/1
5 CAPSULE ORAL DAILY
Qty: 90 CAPSULE | Refills: 3 | Status: SHIPPED | OUTPATIENT
Start: 2024-10-25

## 2024-10-28 ENCOUNTER — LAB REQUISITION (OUTPATIENT)
Dept: LAB | Facility: CLINIC | Age: 44
End: 2024-10-28

## 2024-10-28 ENCOUNTER — DOCUMENTATION ONLY (OUTPATIENT)
Dept: ALLERGY | Facility: CLINIC | Age: 44
End: 2024-10-28

## 2024-10-28 DIAGNOSIS — J02.9 ACUTE PHARYNGITIS, UNSPECIFIED: ICD-10-CM

## 2024-10-28 DIAGNOSIS — Z53.9 NO SHOW: Primary | ICD-10-CM

## 2024-10-28 PROCEDURE — 87077 CULTURE AEROBIC IDENTIFY: CPT | Performed by: STUDENT IN AN ORGANIZED HEALTH CARE EDUCATION/TRAINING PROGRAM

## 2024-10-31 LAB — BACTERIA SPEC CULT: ABNORMAL

## 2024-12-08 ENCOUNTER — HEALTH MAINTENANCE LETTER (OUTPATIENT)
Age: 44
End: 2024-12-08

## 2025-02-10 ENCOUNTER — LAB REQUISITION (OUTPATIENT)
Dept: LAB | Facility: CLINIC | Age: 45
End: 2025-02-10

## 2025-02-10 DIAGNOSIS — J02.9 ACUTE PHARYNGITIS, UNSPECIFIED: ICD-10-CM

## 2025-02-10 PROCEDURE — 87081 CULTURE SCREEN ONLY: CPT | Performed by: STUDENT IN AN ORGANIZED HEALTH CARE EDUCATION/TRAINING PROGRAM

## 2025-02-12 LAB — BACTERIA SPEC CULT: NORMAL

## 2025-04-08 ENCOUNTER — LAB (OUTPATIENT)
Dept: LAB | Facility: CLINIC | Age: 45
End: 2025-04-08
Payer: COMMERCIAL

## 2025-04-08 DIAGNOSIS — E22.1 HYPERPROLACTINEMIA: ICD-10-CM

## 2025-04-08 LAB — PROLACTIN SERPL 3RD IS-MCNC: 36 NG/ML (ref 5–23)

## 2025-04-08 PROCEDURE — 84146 ASSAY OF PROLACTIN: CPT

## 2025-04-08 PROCEDURE — 36415 COLL VENOUS BLD VENIPUNCTURE: CPT

## 2025-04-08 NOTE — PROGRESS NOTES
04/08/25 2:23 PM : Appointment reminder phone call made to patient. Pt verbalized understanding and Provided address,                                                                             - Endocrinology follow up-    Reason for visit/consult:  micro prolactinoma    Primary care provider: Lio Gregorio      Assessment and Plan  A 45 year old female with     Micro prolactinoma (PRL 50-30s)  Previsously not tolerated with cabergoline with menorrheagia, then switched to bromocriptine which worsened her symptoms and stopped taking,       - currently she is more or less tolerating to cabergoline half tab but not consistent to take, MRI 2/2024 no change 6 mm hypoenhanced lesion in the left part of the sella.     - resume cabergoline with 0.5 mg once a week, persistently then recheck PRL level in 3 month and 1 year    Menorrhalgia    Essential HTN  BP today 180-190 SBP, she has Amlodipine 5 mg daily which she has not taking,     - increase Amlodipine from 5 mg to 10 mg daily     - add hydrochlorothiazide 25 mg daily     - recommended to be followed by her PMD        30 minutes spent on the date of the encounter doing chart review, history and exam, documentation and further activities as noted above.        Inez Patrick MD  Staff Physician  Endocrinology and Metabolism  License: [id number]     Interval History as of 4/9/2025 : Patient has been doing ok, very tired from irregular shift work BP has been high, today -190, and not taking BP meds. Medication compliance : not taking bromocriptine due to worsening menorrhagia.   Interval History as of 4/17/2024 : Patient has been doing ok, not much change, still having HA and menorrhalgia, but tolerating to cabergoline half tablet , she is taking inconsistently.  HPI: A 44 yo female here for evaluation of elevated prolactin level.  Patient had COVID vaccination in April 2021 and her menstrual cycle started to become irregular and she started having spotting and second  COVID-vaccine was done in May 2021 and since then she developed amenorrhea.  She went to OB/GYN and prolactin was measured in April 2022 which was mildly elevated to 44.  She underwent a brain MRI which shows micro hypoenhancement lesion on the left part of the sella suspicious for adenoma.   She was seen by endocrinologist in November 2022 where she was started on cabergoline 0.25 mg twice a week, patient tried however she started to have a massive blood clot and continuous bleeding and she stopped taking after 2 months.  Currently she is still a menorrhea.  She also mentioned difficulty to lose weight.  Other medical condition essential hypertension which has been controlled with medications.  She had one childbirth 6 years ago with natural pregnancy.     Past Medical/Surgical History:  Past Medical History:   Diagnosis Date    Abnormal Pap smear of cervix 2009    Hypertension      Past Surgical History:   Procedure Laterality Date    CYST REMOVAL Right     Wrist    TONSILLECTOMY      WISDOM TOOTH EXTRACTION         Allergies:  Allergies   Allergen Reactions    Penicillins Unknown, Anaphylaxis and Rash     Other Reaction(s): Not available, Throat Swelling/Closing    Amoxicillin Unknown    Losartan Angioedema       Current Medications   Current Outpatient Medications   Medication Sig Dispense Refill    amLODIPine (NORVASC) 5 MG tablet Take 5 mg by mouth daily.      cetirizine (ZYRTEC) 10 MG tablet Take 1 tablet (10 mg) by mouth daily 30 tablet 0    EPINEPHrine (ANY BX GENERIC EQUIV) 0.3 MG/0.3ML injection 2-pack Inject 0.3 mLs (0.3 mg) into the muscle as needed for anaphylaxis May repeat one time in 5-15 minutes if response to initial dose is inadequate. 2 each 0    bromocriptine mesylate (PARLODEL) 5 MG capsule Take 1 capsule (5 mg) by mouth daily. (Patient not taking: Reported on 4/9/2025) 90 capsule 3     No current facility-administered medications for this visit.       Family History:  No family history on  file.    Social History:  Social History     Tobacco Use    Smoking status: Never     Passive exposure: Never    Smokeless tobacco: Never   Substance Use Topics    Alcohol use: No       ROS:  Full review of systems taken with the help of the intake sheet. Otherwise a complete 14 point review of systems was taken and is negative unless stated in the history above.      Physical Exam:   Vitals: BP (!) 180/120   Pulse 89   Wt 132.5 kg (292 lb)   LMP 03/26/2025 (Approximate)   SpO2 96%   BMI 44.40 kg/m    BMI= Body mass index is 44.4 kg/m .   General: well appearing, no acute distress, pleasant and conversant,   Mental Status/neuro: alert and oriented  Face: symmetrical, normal facial color  Eyes: anicteric, no proptosis or lid lag  Resp: normally breathing    Labs : I reviewed data from epic and extract and summarize the pertinent data here.   Lab Results   Component Value Date     09/10/2021      Lab Results   Component Value Date    POTASSIUM 4.4 09/10/2021     Lab Results   Component Value Date    CHLORIDE 106 09/10/2021     Lab Results   Component Value Date    LAM 9.1 09/10/2021     Lab Results   Component Value Date    CO2 28 09/10/2021     Lab Results   Component Value Date    BUN 13 09/10/2021     Lab Results   Component Value Date    CR 0.79 09/10/2021     Lab Results   Component Value Date    GLC 94 09/10/2021     Lab Results   Component Value Date    TSH 1.57 01/30/2020     No results found for: T4  No results found for: A1C    No results found for: IGF1  No results found for: LH  No results found for: FSH  No results found for: ESTROGEN  No results found for: PROLACTIN        MRI Brain: I personally reviewed the original images and agree with the below reports.

## 2025-04-09 ENCOUNTER — OFFICE VISIT (OUTPATIENT)
Dept: ENDOCRINOLOGY | Facility: CLINIC | Age: 45
End: 2025-04-09
Payer: COMMERCIAL

## 2025-04-09 VITALS
DIASTOLIC BLOOD PRESSURE: 110 MMHG | BODY MASS INDEX: 44.4 KG/M2 | OXYGEN SATURATION: 96 % | SYSTOLIC BLOOD PRESSURE: 192 MMHG | WEIGHT: 292 LBS | HEART RATE: 89 BPM

## 2025-04-09 DIAGNOSIS — R22.0 TONGUE SWELLING: ICD-10-CM

## 2025-04-09 DIAGNOSIS — D35.2 PROLACTINOMA (H): Primary | ICD-10-CM

## 2025-04-09 DIAGNOSIS — I10 ESSENTIAL HYPERTENSION: ICD-10-CM

## 2025-04-09 PROCEDURE — 1125F AMNT PAIN NOTED PAIN PRSNT: CPT | Performed by: INTERNAL MEDICINE

## 2025-04-09 PROCEDURE — 3077F SYST BP >= 140 MM HG: CPT | Performed by: INTERNAL MEDICINE

## 2025-04-09 PROCEDURE — 3080F DIAST BP >= 90 MM HG: CPT | Performed by: INTERNAL MEDICINE

## 2025-04-09 PROCEDURE — 99214 OFFICE O/P EST MOD 30 MIN: CPT | Performed by: INTERNAL MEDICINE

## 2025-04-09 RX ORDER — HYDROCHLOROTHIAZIDE 25 MG/1
25 TABLET ORAL DAILY
Qty: 90 TABLET | Refills: 3 | Status: SHIPPED | OUTPATIENT
Start: 2025-04-09

## 2025-04-09 RX ORDER — CABERGOLINE 0.5 MG/1
0.5 TABLET ORAL WEEKLY
Qty: 12 TABLET | Refills: 3 | Status: SHIPPED | OUTPATIENT
Start: 2025-04-09

## 2025-04-09 RX ORDER — AMLODIPINE BESYLATE 10 MG/1
10 TABLET ORAL DAILY
Qty: 90 TABLET | Refills: 3 | Status: SHIPPED | OUTPATIENT
Start: 2025-04-09

## 2025-04-09 RX ORDER — CABERGOLINE 0.5 MG/1
0.25 TABLET ORAL WEEKLY
Qty: 6 TABLET | Refills: 3 | Status: SHIPPED | OUTPATIENT
Start: 2025-04-09 | End: 2025-04-09

## 2025-04-09 RX ORDER — AMLODIPINE BESYLATE 5 MG/1
5 TABLET ORAL DAILY
COMMUNITY
End: 2025-04-09

## 2025-04-09 ASSESSMENT — PAIN SCALES - GENERAL: PAINLEVEL_OUTOF10: MILD PAIN (3)

## 2025-04-09 NOTE — NURSING NOTE
"Chief Complaint   Patient presents with    Pituitary Problem     Vital signs:      BP: (!) 180/120 Pulse: 89     SpO2: 96 %       Weight: 132.5 kg (292 lb)  Estimated body mass index is 44.4 kg/m  as calculated from the following:    Height as of 7/2/24: 1.727 m (5' 8\").    Weight as of this encounter: 132.5 kg (292 lb).      Pt states she has been having bad headaches lately and in October she has noticed her eye looking different.    "

## 2025-04-09 NOTE — LETTER
4/9/2025       RE: Heather Ramos  [address]  [place] [address]     Dear Colleague,    Thank you for referring your patient, Heather Ramos, to the [facility] [place] [facility]. Please see a copy of my visit note below.    04/08/25 2:23 PM : Appointment reminder phone call made to patient. Pt verbalized understanding and Provided address,                                                                             - Endocrinology follow up-    Reason for visit/consult:  micro prolactinoma    Primary care provider: Lio Gregorio      Assessment and Plan  A 45 year old female with     Micro prolactinoma (PRL 50-30s)  Previsously not tolerated with cabergoline with menorrheagia, then switched to bromocriptine which worsened her symptoms and stopped taking,       - currently she is more or less tolerating to cabergoline half tab but not consistent to take, MRI 2/2024 no change 6 mm hypoenhanced lesion in the left part of the sella.     - resume cabergoline with 0.5 mg once a week, persistently then recheck PRL level in 3 month and 1 year    Menorrhalgia    Essential HTN  BP today 180-190 SBP, she has Amlodipine 5 mg daily which she has not taking,     - increase Amlodipine from 5 mg to 10 mg daily     - add hydrochlorothiazide 25 mg daily     - recommended to be followed by her PMD        30 minutes spent on the date of the encounter doing chart review, history and exam, documentation and further activities as noted above.        Inez Patrick MD  Staff Physician  Endocrinology and Metabolism  License: [id number]     Interval History as of 4/9/2025 : Patient has been doing ok, very tired from irregular shift work BP has been high, today -190, and not taking BP meds. Medication compliance : not taking bromocriptine due to worsening menorrhagia.   Interval History as of 4/17/2024 : Patient has been doing ok, not much  change, still having HA and menorrhalgia, but tolerating to cabergoline half tablet , she is taking inconsistently.  HPI: A 44 yo female here for evaluation of elevated prolactin level.  Patient had COVID vaccination in April 2021 and her menstrual cycle started to become irregular and she started having spotting and second COVID-vaccine was done in May 2021 and since then she developed amenorrhea.  She went to OB/GYN and prolactin was measured in April 2022 which was mildly elevated to 44.  She underwent a brain MRI which shows micro hypoenhancement lesion on the left part of the sella suspicious for adenoma.   She was seen by endocrinologist in November 2022 where she was started on cabergoline 0.25 mg twice a week, patient tried however she started to have a massive blood clot and continuous bleeding and she stopped taking after 2 months.  Currently she is still a menorrhea.  She also mentioned difficulty to lose weight.  Other medical condition essential hypertension which has been controlled with medications.  She had one childbirth 6 years ago with natural pregnancy.     Past Medical/Surgical History:  Past Medical History:   Diagnosis Date     Abnormal Pap smear of cervix 2009     Hypertension      Past Surgical History:   Procedure Laterality Date     CYST REMOVAL Right     Wrist     TONSILLECTOMY       WISDOM TOOTH EXTRACTION         Allergies:  Allergies   Allergen Reactions     Penicillins Unknown, Anaphylaxis and Rash     Other Reaction(s): Not available, Throat Swelling/Closing     Amoxicillin Unknown     Losartan Angioedema       Current Medications   Current Outpatient Medications   Medication Sig Dispense Refill     amLODIPine (NORVASC) 5 MG tablet Take 5 mg by mouth daily.       cetirizine (ZYRTEC) 10 MG tablet Take 1 tablet (10 mg) by mouth daily 30 tablet 0     EPINEPHrine (ANY BX GENERIC EQUIV) 0.3 MG/0.3ML injection 2-pack Inject 0.3 mLs (0.3 mg) into the muscle as needed for anaphylaxis May  repeat one time in 5-15 minutes if response to initial dose is inadequate. 2 each 0     bromocriptine mesylate (PARLODEL) 5 MG capsule Take 1 capsule (5 mg) by mouth daily. (Patient not taking: Reported on 4/9/2025) 90 capsule 3     No current facility-administered medications for this visit.       Family History:  No family history on file.    Social History:  Social History     Tobacco Use     Smoking status: Never     Passive exposure: Never     Smokeless tobacco: Never   Substance Use Topics     Alcohol use: No       ROS:  Full review of systems taken with the help of the intake sheet. Otherwise a complete 14 point review of systems was taken and is negative unless stated in the history above.      Physical Exam:   Vitals: BP (!) 180/120   Pulse 89   Wt 132.5 kg (292 lb)   LMP 03/26/2025 (Approximate)   SpO2 96%   BMI 44.40 kg/m    BMI= Body mass index is 44.4 kg/m .   General: well appearing, no acute distress, pleasant and conversant,   Mental Status/neuro: alert and oriented  Face: symmetrical, normal facial color  Eyes: anicteric, no proptosis or lid lag  Resp: normally breathing    Labs : I reviewed data from epic and extract and summarize the pertinent data here.   Lab Results   Component Value Date     09/10/2021      Lab Results   Component Value Date    POTASSIUM 4.4 09/10/2021     Lab Results   Component Value Date    CHLORIDE 106 09/10/2021     Lab Results   Component Value Date    LAM 9.1 09/10/2021     Lab Results   Component Value Date    CO2 28 09/10/2021     Lab Results   Component Value Date    BUN 13 09/10/2021     Lab Results   Component Value Date    CR 0.79 09/10/2021     Lab Results   Component Value Date    GLC 94 09/10/2021     Lab Results   Component Value Date    TSH 1.57 01/30/2020     No results found for: T4  No results found for: A1C    No results found for: IGF1  No results found for: LH  No results found for: FSH  No results found for: ESTROGEN  No results found for:  PROLACTIN        MRI Brain: I personally reviewed the original images and agree with the below reports.               Again, thank you for allowing me to participate in the care of your patient.      Sincerely,    Inez Patrick MD

## 2025-07-04 ENCOUNTER — APPOINTMENT (OUTPATIENT)
Dept: CT IMAGING | Facility: HOSPITAL | Age: 45
End: 2025-07-04
Attending: EMERGENCY MEDICINE
Payer: COMMERCIAL

## 2025-07-04 ENCOUNTER — HOSPITAL ENCOUNTER (EMERGENCY)
Facility: HOSPITAL | Age: 45
Discharge: HOME | End: 2025-07-05
Attending: EMERGENCY MEDICINE | Admitting: EMERGENCY MEDICINE
Payer: COMMERCIAL

## 2025-07-04 VITALS
BODY MASS INDEX: 44.84 KG/M2 | RESPIRATION RATE: 24 BRPM | OXYGEN SATURATION: 98 % | HEIGHT: 67 IN | WEIGHT: 285.7 LBS | DIASTOLIC BLOOD PRESSURE: 107 MMHG | TEMPERATURE: 98.4 F | HEART RATE: 89 BPM | SYSTOLIC BLOOD PRESSURE: 202 MMHG

## 2025-07-04 DIAGNOSIS — S16.1XXA CERVICAL STRAIN, INITIAL ENCOUNTER: ICD-10-CM

## 2025-07-04 DIAGNOSIS — M54.2 NECK PAIN ON RIGHT SIDE: ICD-10-CM

## 2025-07-04 PROBLEM — F41.8 MIXED ANXIETY AND DEPRESSIVE DISORDER: Status: ACTIVE | Noted: 2025-07-04

## 2025-07-04 PROBLEM — I10 HIGH BLOOD PRESSURE: Status: ACTIVE | Noted: 2025-07-04

## 2025-07-04 LAB
CREAT BLD-MCNC: 0.8 MG/DL (ref 0.5–1)
EGFRCR SERPLBLD CKD-EPI 2021: >60 ML/MIN/1.73M2

## 2025-07-04 PROCEDURE — 250N000011 HC RX IP 250 OP 636: Performed by: EMERGENCY MEDICINE

## 2025-07-04 PROCEDURE — 96374 THER/PROPH/DIAG INJ IV PUSH: CPT | Mod: 59

## 2025-07-04 PROCEDURE — 250N000013 HC RX MED GY IP 250 OP 250 PS 637: Performed by: EMERGENCY MEDICINE

## 2025-07-04 PROCEDURE — 82565 ASSAY OF CREATININE: CPT

## 2025-07-04 PROCEDURE — 70496 CT ANGIOGRAPHY HEAD: CPT

## 2025-07-04 PROCEDURE — 99285 EMERGENCY DEPT VISIT HI MDM: CPT | Mod: 25

## 2025-07-04 RX ORDER — CYCLOBENZAPRINE HCL 10 MG
10 TABLET ORAL 3 TIMES DAILY PRN
Qty: 21 TABLET | Refills: 0 | Status: SHIPPED | OUTPATIENT
Start: 2025-07-04 | End: 2025-07-11

## 2025-07-04 RX ORDER — KETOROLAC TROMETHAMINE 30 MG/ML
30 INJECTION, SOLUTION INTRAMUSCULAR; INTRAVENOUS ONCE
Status: COMPLETED | OUTPATIENT
Start: 2025-07-04 | End: 2025-07-04

## 2025-07-04 RX ORDER — IOPAMIDOL 755 MG/ML
67 INJECTION, SOLUTION INTRAVASCULAR ONCE
Status: COMPLETED | OUTPATIENT
Start: 2025-07-04 | End: 2025-07-04

## 2025-07-04 RX ORDER — CYCLOBENZAPRINE HCL 10 MG
10 TABLET ORAL ONCE
Status: COMPLETED | OUTPATIENT
Start: 2025-07-04 | End: 2025-07-04

## 2025-07-04 RX ADMIN — CYCLOBENZAPRINE 10 MG: 10 TABLET, FILM COATED ORAL at 22:33

## 2025-07-04 RX ADMIN — IOPAMIDOL 67 ML: 755 INJECTION, SOLUTION INTRAVENOUS at 23:02

## 2025-07-04 RX ADMIN — KETOROLAC TROMETHAMINE 30 MG: 30 INJECTION, SOLUTION INTRAMUSCULAR at 22:39

## 2025-07-04 ASSESSMENT — COLUMBIA-SUICIDE SEVERITY RATING SCALE - C-SSRS
2. HAVE YOU ACTUALLY HAD ANY THOUGHTS OF KILLING YOURSELF IN THE PAST MONTH?: NO
6. HAVE YOU EVER DONE ANYTHING, STARTED TO DO ANYTHING, OR PREPARED TO DO ANYTHING TO END YOUR LIFE?: NO
1. IN THE PAST MONTH, HAVE YOU WISHED YOU WERE DEAD OR WISHED YOU COULD GO TO SLEEP AND NOT WAKE UP?: NO

## 2025-07-04 ASSESSMENT — ACTIVITIES OF DAILY LIVING (ADL): ADLS_ACUITY_SCORE: 41

## 2025-07-04 NOTE — Clinical Note
Heather Ramos was seen and treated in our emergency department on 7/4/2025.  She may return to work on 07/06/2025.       If you have any questions or concerns, please don't hesitate to call.      Preston Diaz MD

## 2025-07-05 NOTE — ED PROVIDER NOTES
EMERGENCY DEPARTMENT ENCOUNTER      NAME: Heather Ramos  AGE: 45 year old female  YOB: 1980  MRN: [id number]  EVALUATION DATE & TIME: 7/4/2025 10:15 PM    PCP: Lio Gregorio    ED PROVIDER: Alethea Valentine MD    Chief Complaint   Patient presents with    Neck Pain         FINAL IMPRESSION:  1. Neck pain on right side    2. Cervical strain, initial encounter          ED COURSE & MEDICAL DECISION MAKING:    Pertinent Labs & Imaging studies reviewed. (See chart for details)  45 year old female with history of HTN, anxiety/depression who presents to the Emergency Department for evaluation of neck pain, headache and dizziness after she has been frequenting [employer] and riding many wire rides  Over the course the last several weeks.  Initially thought that this was just whiplash, but it does not seem to be getting better with ice, heat, massage, OTCs.  On examination patient has tenderness to the right trapezius region.  Overall favor this to be cervical spasm, but vertebral artery dissection on the differential.  Patient is also hypertensive here, history of same has been noncompliant with her antihypertensives.  Has follow-up with endocrinology in 4 days to discuss medication management for her blood pressure.    Patient placed on monitor, IV established.  Given dose of Toradol, Flexeril.  CTA of the head and neck ordered, results of this are currently pending at the time send patient signed out to oncoming physician awaiting results of same.  If unremarkable would feel comfortable with discharge home with muscle relaxers.      ED Course as of 07/04/25 2246   Fri Jul 04, 2025   2225 BP(!): 219/128       Medical Decision Making  I reviewed the EMR: See HPI  Admission considered. Patient was signed out to the oncoming physician, disposition pending.    MIPS (CTPE, Dental pain, Randall, Sinusitis, Asthma/COPD, Head Trauma): Not Applicable    SEPSIS: None          At the conclusion of the encounter I  "discussed the results of all of the tests and the disposition. The questions were answered. The patient or family acknowledged understanding and was agreeable with the care plan.      MEDICATIONS GIVEN IN THE EMERGENCY:  Medications   cyclobenzaprine (FLEXERIL) tablet 10 mg (10 mg Oral $Given 7/4/25 2233)   ketorolac (TORADOL) injection 30 mg (30 mg Intravenous $Given 7/4/25 2239)       NEW PRESCRIPTIONS STARTED AT TODAY'S ER VISIT  New Prescriptions    No medications on file          =================================================================    HPI    Patient information was obtained from: patient     Use of Intrepreter: N/A        Heather Ramos is a 45 year old female with pertinent medical history of high blood pressure and mild anxiety and depressive disorder who presents with neck pain.    Patient has been experiencing neck pain for the past 2 weeks following a trip to [facility]. She has seen a massage therapist for the neck pain and states that it helped a bit, however, she states that today the pain is unbearable. She describes the pain as \"pressure, tightness, and stiffness\" on the right side of her neck into her trapezius. She notes headache, ear ringing, and dizziness as well. She reports that the dizziness gets worse when she needs to focus or when someone is talking to her. She notes that her baseline blood pressure is in the 180ish/110ish, and that she has not been taking her blood pressure medications for months.    Per chart review, patient was seen at [facility] on 04/09/2025 for headaches. Visit diagnoses were prolactinoma, tongue swelling, and HTN. Medication changes include: staring 0.5mg cabergoline and 25mg hydrochlorothiazide, and stopping 5mg bromocriptine mesylate, 50-12.5mg losartan potasium, and 4mg methylprednisolone.    PAST MEDICAL HISTORY:  Past Medical History:   Diagnosis Date    Abnormal Pap smear of cervix 2009    Hypertension        PAST " "SURGICAL HISTORY:  Past Surgical History:   Procedure Laterality Date    CYST REMOVAL Right     Wrist    TONSILLECTOMY      WISDOM TOOTH EXTRACTION         CURRENT MEDICATIONS:    Prior to Admission Medications   Prescriptions Last Dose Informant Patient Reported? Taking?   EPINEPHrine (ANY BX GENERIC EQUIV) 0.3 MG/0.3ML injection 2-pack   No No   Sig: Inject 0.3 mLs (0.3 mg) into the muscle as needed for anaphylaxis May repeat one time in 5-15 minutes if response to initial dose is inadequate.   amLODIPine (NORVASC) 10 MG tablet   No No   Sig: Take 1 tablet (10 mg) by mouth daily.   cabergoline (DOSTINEX) 0.5 MG tablet   No No   Sig: Take 1 tablet (0.5 mg) by mouth once a week.   cetirizine (ZYRTEC) 10 MG tablet   No No   Sig: Take 1 tablet (10 mg) by mouth daily   hydrochlorothiazide (HYDRODIURIL) 25 MG tablet   No No   Sig: Take 1 tablet (25 mg) by mouth daily.      Facility-Administered Medications: None       ALLERGIES:  Allergies   Allergen Reactions    Penicillins Unknown, Anaphylaxis and Rash     Other Reaction(s): Not available, Throat Swelling/Closing    Amoxicillin Unknown    Losartan Angioedema       FAMILY HISTORY:  No family history on file.    SOCIAL HISTORY:  Social History     Tobacco Use    Smoking status: Never     Passive exposure: Never    Smokeless tobacco: Never   Substance Use Topics    Alcohol use: No    Drug use: No        VITALS:  Patient Vitals for the past 24 hrs:   BP Temp Temp src Pulse Resp SpO2 Height Weight   07/04/25 2209 (!) 219/128 98.4  F (36.9  C) Oral 100 24 97 % 1.702 m (5' 7\") 129.6 kg (285 lb 11.2 oz)       PHYSICAL EXAM    General Appearance: Well-appearing, well-nourished, no acute distress   Head:  Normocephalic  Neck:  Supple, Tightness in the right trapezius and right paracervical muscles.   Cardio: Hypertensive. Regular rate and rhythm  Pulm:  No respiratory distress  Abdomen:  Soft, non-tender  Extremities: 5/5 upper extremity strength. Extremities normal, " atraumatic, no cyanosis or edema, full ROM and motor tone intact, bilateral pulses intact upper and lower  Skin:  Skin warm, dry, no rashes  Neuro: Cranial nerves III-XII intact. Alert and oriented ×3, moving all extremities, no gross sensory defects     RADIOLOGY/LABS:  Reviewed all pertinent imaging. Please see official radiology report. All pertinent labs reviewed and interpreted.             The creation of this record is based on the scribe s observations of the work being performed by Alethea Valentine MD and the provider s statements to them. It was created on her behalf by Gisele Perdomo, a trained medical scribe. This document has been checked and approved by the attending provider.    Alethea Valentine MD  Emergency Medicine  [facility] EMERGENCY DEPARTMENT  [address] [place] [address] [id number] [phone]  Dept: [phone]     Alethea Valentine MD  07/04/25 0136

## 2025-07-05 NOTE — DISCHARGE INSTRUCTIONS
In addition to treating your symptoms with over-the-counter remedies like Tylenol, ibuprofen, lidocaine patches, IcyHot, you may also use the prescribed muscle relaxant to help with pain.    Please return to the ER if symptoms worsen, if you develop new numbness/weakness on one side of your body, vision loss.  
No (0)

## 2025-07-05 NOTE — ED NOTES
EMERGENCY DEPARTMENT SIGN OUT NOTE        ED COURSE AND MEDICAL DECISION MAKING  Patient was signed out to me by Dr Alethea Valentine at 11:03 PM/    In brief, Heather Ramos is a 45 year old female who initially presented for evaluation of neck pain. Patient reports she has been going to [employer] a few times recently, and thinks the rides may be playing a role in her pain. Pain has been ongoing for a few weeks. Pain has begun radiating into her eyes.     At time of sign out, disposition was pending CTA head and neck.     11:48 PM CTA head and neck negative for acute pathology, specifically no dissection on my interpretation. Will discharge at this time with plan to treat MSK pain.    FINAL IMPRESSION    1. Neck pain on right side    2. Cervical strain, initial encounter        ED MEDS  Medications   cyclobenzaprine (FLEXERIL) tablet 10 mg (10 mg Oral $Given 7/4/25 2233)   ketorolac (TORADOL) injection 30 mg (30 mg Intravenous $Given 7/4/25 2239)   iopamidol (ISOVUE-370) solution 67 mL (67 mLs Intravenous $Given 7/4/25 2302)       LAB  Labs Ordered and Resulted from Time of ED Arrival to Time of ED Departure   ISTAT CREATININE POCT - Normal       Result Value    Creatinine POCT 0.8      GFR, ESTIMATED POCT >60     ISTAT CREATININE POCT       EKG  None    RADIOLOGY    CTA Head Neck with Contrast   Final Result   IMPRESSION:    HEAD CT:   1.  Normal head CT.      HEAD CTA:    1.  Normal CTA [place] [facility] [place].      NECK CTA:   1.  Normal neck CTA.          DISCHARGE MEDS  New Prescriptions    No medications on file       Preston Diaz MD  [facility] EMERGENCY DEPARTMENT  [address] [place] [id number]  [phone]         Preston Diaz MD  07/04/25 9042

## 2025-07-05 NOTE — ED TRIAGE NOTES
"Patient arrives to triage with chief complaint of neck pain.  Patient had been going to [employer] a few times and thinks maybe the rides caused whip last or muscle strain.  Pain has been ongoing for a couple weeks now.  Reports trying massage, heat and cold therapy without relief.  Patient also reports pain starting to go into eyes.  Alert and oriented x4.     History of high blood pressure, hasn't been taking medications.  Blood pressure in triage 219/128 mmHg.        No falls or \"known\" injuries to neck.   "

## 2025-07-07 ENCOUNTER — TELEPHONE (OUTPATIENT)
Dept: ENDOCRINOLOGY | Facility: CLINIC | Age: 45
End: 2025-07-07
Payer: COMMERCIAL

## 2025-07-07 DIAGNOSIS — D35.2 PROLACTINOMA (H): Primary | ICD-10-CM

## 2025-07-07 NOTE — TELEPHONE ENCOUNTER
[facility] Call Center    Phone Message    May a detailed message be left on voicemail: yes     Reason for Call: Other: Pt was told to notify Dr. Patrick for any possibility of pregnancy while taking cabergoline. Pt states she had a mishap during intercourse where the condom broke. Pt would like to know how to proceed. Please advise.      Action Taken: Other: Endo    Travel Screening: Not Applicable     Date of Service:

## 2025-07-09 ENCOUNTER — LAB (OUTPATIENT)
Dept: LAB | Facility: CLINIC | Age: 45
End: 2025-07-09
Payer: COMMERCIAL

## 2025-07-09 DIAGNOSIS — D35.2 PROLACTINOMA (H): ICD-10-CM

## 2025-07-09 LAB
HCG INTACT+B SERPL-ACNC: <1 MIU/ML
PROLACTIN SERPL 3RD IS-MCNC: 55 NG/ML (ref 5–23)

## 2025-07-09 PROCEDURE — 84146 ASSAY OF PROLACTIN: CPT | Performed by: INTERNAL MEDICINE

## 2025-07-09 PROCEDURE — 36415 COLL VENOUS BLD VENIPUNCTURE: CPT | Performed by: PATHOLOGY

## 2025-07-09 PROCEDURE — 84702 CHORIONIC GONADOTROPIN TEST: CPT | Performed by: PATHOLOGY

## 2025-07-09 PROCEDURE — 99000 SPECIMEN HANDLING OFFICE-LAB: CPT | Performed by: PATHOLOGY
